# Patient Record
Sex: MALE | Race: WHITE | NOT HISPANIC OR LATINO
[De-identification: names, ages, dates, MRNs, and addresses within clinical notes are randomized per-mention and may not be internally consistent; named-entity substitution may affect disease eponyms.]

---

## 2017-04-11 ENCOUNTER — FORM ENCOUNTER (OUTPATIENT)
Age: 72
End: 2017-04-11

## 2017-05-31 ENCOUNTER — OUTPATIENT (OUTPATIENT)
Dept: OUTPATIENT SERVICES | Facility: HOSPITAL | Age: 72
LOS: 1 days | End: 2017-05-31
Payer: MEDICARE

## 2017-05-31 VITALS
TEMPERATURE: 98 F | SYSTOLIC BLOOD PRESSURE: 110 MMHG | RESPIRATION RATE: 16 BRPM | HEIGHT: 72.5 IN | HEART RATE: 60 BPM | DIASTOLIC BLOOD PRESSURE: 68 MMHG | WEIGHT: 268.08 LBS

## 2017-05-31 DIAGNOSIS — Z98.890 OTHER SPECIFIED POSTPROCEDURAL STATES: Chronic | ICD-10-CM

## 2017-05-31 DIAGNOSIS — K40.90 UNILATERAL INGUINAL HERNIA, WITHOUT OBSTRUCTION OR GANGRENE, NOT SPECIFIED AS RECURRENT: ICD-10-CM

## 2017-05-31 DIAGNOSIS — Z12.11 ENCOUNTER FOR SCREENING FOR MALIGNANT NEOPLASM OF COLON: Chronic | ICD-10-CM

## 2017-05-31 DIAGNOSIS — E11.9 TYPE 2 DIABETES MELLITUS WITHOUT COMPLICATIONS: ICD-10-CM

## 2017-05-31 DIAGNOSIS — G47.33 OBSTRUCTIVE SLEEP APNEA (ADULT) (PEDIATRIC): ICD-10-CM

## 2017-05-31 LAB
BUN SERPL-MCNC: 15 MG/DL — SIGNIFICANT CHANGE UP (ref 7–23)
CALCIUM SERPL-MCNC: 9.5 MG/DL — SIGNIFICANT CHANGE UP (ref 8.4–10.5)
CHLORIDE SERPL-SCNC: 103 MMOL/L — SIGNIFICANT CHANGE UP (ref 98–107)
CO2 SERPL-SCNC: 28 MMOL/L — SIGNIFICANT CHANGE UP (ref 22–31)
CREAT SERPL-MCNC: 1.17 MG/DL — SIGNIFICANT CHANGE UP (ref 0.5–1.3)
GLUCOSE SERPL-MCNC: 95 MG/DL — SIGNIFICANT CHANGE UP (ref 70–99)
HBA1C BLD-MCNC: 6.1 % — HIGH (ref 4–5.6)
HCT VFR BLD CALC: 42.9 % — SIGNIFICANT CHANGE UP (ref 39–50)
HGB BLD-MCNC: 14.2 G/DL — SIGNIFICANT CHANGE UP (ref 13–17)
MCHC RBC-ENTMCNC: 30.2 PG — SIGNIFICANT CHANGE UP (ref 27–34)
MCHC RBC-ENTMCNC: 33.1 % — SIGNIFICANT CHANGE UP (ref 32–36)
MCV RBC AUTO: 91.3 FL — SIGNIFICANT CHANGE UP (ref 80–100)
PLATELET # BLD AUTO: 283 K/UL — SIGNIFICANT CHANGE UP (ref 150–400)
PMV BLD: 10.1 FL — SIGNIFICANT CHANGE UP (ref 7–13)
POTASSIUM SERPL-MCNC: 4.8 MMOL/L — SIGNIFICANT CHANGE UP (ref 3.5–5.3)
POTASSIUM SERPL-SCNC: 4.8 MMOL/L — SIGNIFICANT CHANGE UP (ref 3.5–5.3)
RBC # BLD: 4.7 M/UL — SIGNIFICANT CHANGE UP (ref 4.2–5.8)
RBC # FLD: 13.7 % — SIGNIFICANT CHANGE UP (ref 10.3–14.5)
SODIUM SERPL-SCNC: 143 MMOL/L — SIGNIFICANT CHANGE UP (ref 135–145)
WBC # BLD: 7.14 K/UL — SIGNIFICANT CHANGE UP (ref 3.8–10.5)
WBC # FLD AUTO: 7.14 K/UL — SIGNIFICANT CHANGE UP (ref 3.8–10.5)

## 2017-05-31 PROCEDURE — 93010 ELECTROCARDIOGRAM REPORT: CPT

## 2017-05-31 RX ORDER — SODIUM CHLORIDE 9 MG/ML
1000 INJECTION, SOLUTION INTRAVENOUS
Qty: 0 | Refills: 0 | Status: DISCONTINUED | OUTPATIENT
Start: 2017-06-09 | End: 2017-06-24

## 2017-05-31 NOTE — H&P PST ADULT - GASTROINTESTINAL DETAILS
bowel sounds normal/soft/no masses palpable/nontender/no distention bowel sounds normal/nontender/no distention/soft/no masses palpable/obese

## 2017-05-31 NOTE — H&P PST ADULT - PROBLEM SELECTOR PLAN 1
Pt scheduled for repair of left inguinal hernia with mesh for 6/9/17. Preop instructions, Pepcid, surgical scrub provided. Pt stated understanding. Comparison EKG on chart from 8/5/15. Pending medical clearance.

## 2017-05-31 NOTE — H&P PST ADULT - NEGATIVE MUSCULOSKELETAL SYMPTOMS
no back pain/no stiffness/no neck pain/no joint swelling/no arthralgia/no arthritis/no muscle cramps/no myalgia

## 2017-05-31 NOTE — H&P PST ADULT - PSH
Encounter for screening colonoscopy    H/O eye surgery  removal of right eye pigmentation >35 years ago  H/O right inguinal hernia repair  8/2015

## 2017-05-31 NOTE — H&P PST ADULT - NEGATIVE CARDIOVASCULAR SYMPTOMS
no paroxysmal nocturnal dyspnea/no claudication/no palpitations/no chest pain/no orthopnea/no dyspnea on exertion/no peripheral edema

## 2017-05-31 NOTE — H&P PST ADULT - GENERAL GENITOURINARY SYMPTOMS
nocturia/increased urinary frequency nocturia/due to BPH, follows up with urology/increased urinary frequency

## 2017-05-31 NOTE — H&P PST ADULT - NEUROLOGICAL DETAILS
normal strength/alert and oriented x 3 cranial nerves intact/sensation intact/alert and oriented x 3

## 2017-05-31 NOTE — H&P PST ADULT - NEGATIVE NEUROLOGICAL SYMPTOMS
no focal seizures/no facial palsy/no paresthesias/no loss of sensation/no confusion/no difficulty walking/no syncope/no tremors/no generalized seizures/no vertigo/no transient paralysis/no weakness

## 2017-05-31 NOTE — H&P PST ADULT - PMH
Asthma  h/o childhood  BPH (benign prostatic hypertrophy)    Diabetes mellitus  type II  Dyslipidemia    Inguinal hernia  Left  Intracranial hemorrhage  h/o 20 yrs ago  Obesity    Obstructive sleep apnea Asthma  h/o childhood  BPH (benign prostatic hypertrophy)    Diabetes mellitus  type II  Dyslipidemia    Inguinal hernia  Left  Intracranial hemorrhage  h/o 20 yrs ago  Obesity    Obstructive sleep apnea    Umbilical hernia

## 2017-05-31 NOTE — H&P PST ADULT - NEGATIVE OPHTHALMOLOGIC SYMPTOMS
no discharge L/no pain L/no loss of vision R/no diplopia/no blurred vision R/no discharge R/no pain R/no blurred vision L/no photophobia/no loss of vision L

## 2017-05-31 NOTE — H&P PST ADULT - HISTORY OF PRESENT ILLNESS
73 years     Pt is scheduled for repair of left inguinal hernia with mesh for 6/9/17 73 years old male with Hx of left inguinal hernia, diagnosed on a CT scan, becoming bothersome, in the past year, associated with mild discomfort. Pt is s/p right inguinal hernia repair in 8/2015. Pt scheduled for repair of left inguinal hernia with mesh for 6/9/17

## 2017-05-31 NOTE — H&P PST ADULT - RS GEN PE MLT RESP DETAILS PC
clear to auscultation bilaterally/respirations non-labored/breath sounds equal airway patent/clear to auscultation bilaterally/no rhonchi/respirations non-labored/no intercostal retractions/no chest wall tenderness/no rales/no subcutaneous emphysema/good air movement/breath sounds equal/no wheezes

## 2017-05-31 NOTE — H&P PST ADULT - NEGATIVE GENERAL GENITOURINARY SYMPTOMS
no bladder infections/no dysuria/no flank pain L/no incontinence/no urinary hesitancy/no renal colic no renal colic/no flank pain L/no flank pain R/no incontinence/no dysuria/no urinary hesitancy/no bladder infections

## 2017-05-31 NOTE — H&P PST ADULT - VISION (WITH CORRECTIVE LENSES IF THE PATIENT USUALLY WEARS THEM):
Pt uses glasses/Partially impaired: cannot see medication labels or newsprint, but can see obstacles in path, and the surrounding layout; can count fingers at arm's length

## 2017-05-31 NOTE — H&P PST ADULT - GASTROINTESTINAL COMMENTS
umbilical hernia, and left inguinal hernia umbilical hernia umbilical hernia, left inguinal hernia, very small, palpable upon coughing, non tender

## 2017-05-31 NOTE — H&P PST ADULT - FAMILY HISTORY
Mother  Still living? No  Family history of lung cancer, Age at diagnosis: 71-80     Father  Still living? No  Family history of diabetes mellitus, Age at diagnosis: Age Unknown  Family history of coronary artery disease, Age at diagnosis: Age Unknown

## 2017-05-31 NOTE — H&P PST ADULT - CONSTITUTIONAL DETAILS
no distress/well-nourished/well-groomed no distress/well-nourished/well-groomed/well-developed/obese

## 2017-05-31 NOTE — H&P PST ADULT - NEGATIVE GENERAL SYMPTOMS
no fever/no malaise/no weight loss/no polyphagia/no polyuria/no polydipsia/no fatigue/no anorexia/no weight gain/no chills/no sweating

## 2017-05-31 NOTE — H&P PST ADULT - LYMPHATIC
anterior cervical L/supraclavicular R/supraclavicular L/posterior cervical L/posterior cervical R/anterior cervical R

## 2017-05-31 NOTE — H&P PST ADULT - MUSCULOSKELETAL
details… detailed exam no joint warmth/no joint swelling/no joint erythema no joint warmth/ROM intact/no calf tenderness/no joint erythema/normal strength/no joint swelling

## 2017-06-08 NOTE — ASU PATIENT PROFILE, ADULT - PMH
Asthma  h/o childhood  BPH (benign prostatic hypertrophy)    Diabetes mellitus  type II  Dyslipidemia    Inguinal hernia  Left  Intracranial hemorrhage  h/o 20 yrs ago  Obesity    Obstructive sleep apnea    Umbilical hernia

## 2017-06-09 ENCOUNTER — RESULT REVIEW (OUTPATIENT)
Age: 72
End: 2017-06-09

## 2017-06-09 ENCOUNTER — TRANSCRIPTION ENCOUNTER (OUTPATIENT)
Age: 72
End: 2017-06-09

## 2017-06-09 ENCOUNTER — OUTPATIENT (OUTPATIENT)
Dept: OUTPATIENT SERVICES | Facility: HOSPITAL | Age: 72
LOS: 1 days | Discharge: HOME | End: 2017-06-09
Payer: MEDICARE

## 2017-06-09 VITALS
OXYGEN SATURATION: 96 % | RESPIRATION RATE: 16 BRPM | WEIGHT: 268.08 LBS | TEMPERATURE: 98 F | DIASTOLIC BLOOD PRESSURE: 74 MMHG | HEIGHT: 72.5 IN | SYSTOLIC BLOOD PRESSURE: 130 MMHG | HEART RATE: 67 BPM

## 2017-06-09 VITALS
RESPIRATION RATE: 18 BRPM | OXYGEN SATURATION: 97 % | DIASTOLIC BLOOD PRESSURE: 75 MMHG | HEART RATE: 74 BPM | SYSTOLIC BLOOD PRESSURE: 120 MMHG

## 2017-06-09 DIAGNOSIS — K40.90 UNILATERAL INGUINAL HERNIA, WITHOUT OBSTRUCTION OR GANGRENE, NOT SPECIFIED AS RECURRENT: ICD-10-CM

## 2017-06-09 DIAGNOSIS — Z98.890 OTHER SPECIFIED POSTPROCEDURAL STATES: Chronic | ICD-10-CM

## 2017-06-09 DIAGNOSIS — Z12.11 ENCOUNTER FOR SCREENING FOR MALIGNANT NEOPLASM OF COLON: Chronic | ICD-10-CM

## 2017-06-09 PROCEDURE — 88304 TISSUE EXAM BY PATHOLOGIST: CPT | Mod: 26

## 2017-06-09 PROCEDURE — 88305 TISSUE EXAM BY PATHOLOGIST: CPT | Mod: 26

## 2017-06-09 RX ORDER — TAMSULOSIN HYDROCHLORIDE 0.4 MG/1
0.4 CAPSULE ORAL ONCE
Qty: 0 | Refills: 0 | Status: COMPLETED | OUTPATIENT
Start: 2017-06-09 | End: 2017-06-09

## 2017-06-09 RX ORDER — HYDROMORPHONE HYDROCHLORIDE 2 MG/ML
1 INJECTION INTRAMUSCULAR; INTRAVENOUS; SUBCUTANEOUS
Qty: 0 | Refills: 0 | Status: DISCONTINUED | OUTPATIENT
Start: 2017-06-09 | End: 2017-06-09

## 2017-06-09 RX ORDER — ONDANSETRON 8 MG/1
4 TABLET, FILM COATED ORAL ONCE
Qty: 0 | Refills: 0 | Status: DISCONTINUED | OUTPATIENT
Start: 2017-06-09 | End: 2017-06-09

## 2017-06-09 RX ORDER — LIDOCAINE HCL 20 MG/ML
20 VIAL (ML) INJECTION ONCE
Qty: 0 | Refills: 0 | Status: DISCONTINUED | OUTPATIENT
Start: 2017-06-09 | End: 2017-06-24

## 2017-06-09 RX ORDER — LIDOCAINE HCL 20 MG/ML
VIAL (ML) INJECTION
Qty: 0 | Refills: 0 | Status: DISCONTINUED | OUTPATIENT
Start: 2017-06-09 | End: 2017-06-24

## 2017-06-09 RX ORDER — LIDOCAINE 4 G/100G
1 CREAM TOPICAL ONCE
Qty: 0 | Refills: 0 | Status: DISCONTINUED | OUTPATIENT
Start: 2017-06-09 | End: 2017-06-24

## 2017-06-09 RX ORDER — OXYCODONE HYDROCHLORIDE 5 MG/1
1 TABLET ORAL
Qty: 25 | Refills: 0 | OUTPATIENT
Start: 2017-06-09

## 2017-06-09 RX ORDER — SODIUM CHLORIDE 9 MG/ML
1000 INJECTION, SOLUTION INTRAVENOUS
Qty: 0 | Refills: 0 | Status: DISCONTINUED | OUTPATIENT
Start: 2017-06-09 | End: 2017-06-24

## 2017-06-09 RX ORDER — HYDROMORPHONE HYDROCHLORIDE 2 MG/ML
0.5 INJECTION INTRAMUSCULAR; INTRAVENOUS; SUBCUTANEOUS
Qty: 0 | Refills: 0 | Status: DISCONTINUED | OUTPATIENT
Start: 2017-06-09 | End: 2017-06-09

## 2017-06-09 RX ADMIN — SODIUM CHLORIDE 50 MILLILITER(S): 9 INJECTION, SOLUTION INTRAVENOUS at 11:13

## 2017-06-09 RX ADMIN — TAMSULOSIN HYDROCHLORIDE 0.4 MILLIGRAM(S): 0.4 CAPSULE ORAL at 14:56

## 2017-06-09 RX ADMIN — HYDROMORPHONE HYDROCHLORIDE 0.5 MILLIGRAM(S): 2 INJECTION INTRAMUSCULAR; INTRAVENOUS; SUBCUTANEOUS at 11:55

## 2017-06-09 RX ADMIN — HYDROMORPHONE HYDROCHLORIDE 0.5 MILLIGRAM(S): 2 INJECTION INTRAMUSCULAR; INTRAVENOUS; SUBCUTANEOUS at 12:05

## 2017-06-09 NOTE — PROCEDURE NOTE - NSURITECHNIQUE_GU_A_CORE
The catheter was appropriately lubricated/The site was cleaned with soap/water and sterile solution (betadine)/The catheter was secured with a securement device (e.g. StatLock)/The urinary drainage system is closed at the end of the procedure/All applicable medical record documentation is completed/Proper hand hygiene was performed/A sterile drape was used to cover all adjacent areas/Sterile gloves were worn for the duration of the procedure

## 2017-06-09 NOTE — PROCEDURE NOTE - NSPOSTCAREGUIDE_GEN_A_CORE
Instructed patient/caregiver regarding signs and symptoms of infection/Instructed patient/caregiver to follow-up with primary care physician/Verbal/written post procedure instructions were given to patient/caregiver

## 2017-06-09 NOTE — ASU DISCHARGE PLAN (ADULT/PEDIATRIC). - MEDICATION SUMMARY - MEDICATIONS TO TAKE
I will START or STAY ON the medications listed below when I get home from the hospital:    bupropion  mg 2 tabs orally daily  last dose 5/31  --     -- Indication: For Anxiety/Depression    acetaminophen-oxycodone 325 mg-5 mg oral tablet  -- 1 to 2 tab(s) by mouth every 4 to 6 hours, As Needed for Moderate to Severe Pain MDD:8 tablets  -- Caution federal law prohibits the transfer of this drug to any person other  than the person for whom it was prescribed.  May cause drowsiness.  Alcohol may intensify this effect.  Use care when operating dangerous machinery.  This prescription cannot be refilled.  This product contains acetaminophen.  Do not use  with any other product containing acetaminophen to prevent possible liver damage.  Using more of this medication than prescribed may cause serious breathing problems.    -- Indication: For Pain control    tamsulosin 0.4 mg oral capsule  -- 1 cap(s) by mouth once a day in pm  -- Indication: For Enlarged prostate    zonisamide 100 mg oral capsule  -- 1 cap(s) by mouth once a day  last dsoe 5/31  -- Indication: For Home medication    metFORMIN 500 mg oral tablet  -- 2 tab(s) by mouth once a day (at bedtime)  -- Indication: For Diabetes    simvastatin 40 mg oral tablet  -- 1 tab(s) by mouth once a day (at bedtime)  -- Indication: For Hyperlipidemia    naltrexone 50 mg oral tablet  -- 1 tab(s) by mouth once a day  last dose 5/31  -- Do not take this medication if you require narcotics for pain control  -- Indication: For Opioid dependence    azelastine 137 mcg/inh (0.1%) nasal spray  -- 2 spray(s) into nose 2 times a day, As Needed  -- Indication: For Nasal congestion    fluticasone 50 mcg/inh nasal spray  -- 1 spray(s) into nose 2 times a day  -- Indication: For Allergies

## 2017-06-09 NOTE — ASU DISCHARGE PLAN (ADULT/PEDIATRIC). - NURSING INSTRUCTIONS
Do not take pain medication on an empty stomach.  Increase fluids and fiber in diet to prevent constipation. No heavy lifting or straining.  Apply ice 20 minutes on 20 minutes off for pain management.  Leave steri strips intact.  Take pain medicine with food. To prevent constipation increase fluids and fiber in diet. Do not take pain medication on an empty stomach.  Increase fluids and fiber in diet to prevent constipation. No heavy lifting or straining.  Apply ice 20 minutes on 20 minutes off for pain management.  Leave steri strips intact.  Take pain medicine with food. To prevent constipation increase fluids and fiber in diet.  If at any time you notice that no urine has drained for more than one hour, make sure that you are drinking adequate liquids. If this persists despite increases in your liquid intake, call your urologist. Do not allow the catheter to restrict your activity. Moving about and walking are important.  South care notes/ leg bag care notes provided

## 2017-06-09 NOTE — PROCEDURE NOTE - NSPROCDETAILS_GEN_ALL_CORE
kit not available for this size catheter/prior to placement, an active physician order for the placement of a urinary catheter was verified/sterile technique, indwelling urinary device inserted

## 2017-06-09 NOTE — ASU DISCHARGE PLAN (ADULT/PEDIATRIC). - NOTIFY
Unable to Urinate/Persistent Nausea and Vomiting/Bleeding that does not stop/Fever greater than 101/Inability to Tolerate Liquids or Foods/Pain not relieved by Medications Persistent Nausea and Vomiting/Inability to Tolerate Liquids or Foods/Unable to Urinate/Bleeding that does not stop/Pain not relieved by Medications/Numbness, color, or temperature change to extremity/Fever greater than 101/Numbness, tingling

## 2017-06-09 NOTE — ASU DISCHARGE PLAN (ADULT/PEDIATRIC). - FOLLOWUP APPOINTMENT CLINIC/PHYSICIAN
follow upw ith Dr Carpenter within 1-2 weeks  Must follow up with Dr Espinoza in 1 week for ba removal

## 2017-06-09 NOTE — ASU DISCHARGE PLAN (ADULT/PEDIATRIC). - SPECIAL INSTRUCTIONS
Your left groin hernia was repaired. It was also felt that it was necessary to remove the skin lesion next to the hernia incision. Take pain medication as needed. A prescription has already been sent to your pharmacy. Don't drive if taking narcotics. No heavy lifting for 6-8 weeks. You may shower after 24 hours. On your wound are white strips called steri strips. These will fall off on their own or will be removed in office. Follow up with Dr. Carpenter in 1-2 weeks to check the wounds and for pathology results. Call to schedule an appointment. Your left groin hernia was repaired. It was also felt that it was necessary to remove the skin lesion next to the hernia incision. Take pain medication as needed. A prescription has already been sent to your pharmacy. Don't drive if taking narcotics. No heavy lifting for 6-8 weeks. You may shower after 24 hours. On your wound are white strips called steri strips. These will fall off on their own or will be removed in office. Follow up with Dr. Carpenter in 1-2 weeks to check the wounds and for pathology results. Call to schedule an appointment.    You were unable to void after your surgery due to your BPH. You will go home with a ba catheter and keep this in place until your follow up with your urologist. Your left groin hernia was repaired. It was also felt that it was necessary to remove the skin lesion next to the hernia incision. Take pain medication as needed. A prescription has already been sent to your pharmacy. Don't drive if taking narcotics. No heavy lifting for 6-8 weeks. You may shower after 24 hours. On your wound are white strips called steri strips. These will fall off on their own or will be removed in office. Follow up with Dr. Carpenter in 1-2 weeks to check the wounds and for pathology results. Call to schedule an appointment.    You were unable to void after your surgery due to your BPH. A Coude catheter was inserted. You will go home with this ba catheter and keep it in place until your follow up with your urologist in 4-7 days. Your left groin hernia was repaired. It was also felt that it was necessary to remove the skin lesion next to the hernia incision. Take pain medication as needed. A prescription has already been sent to your pharmacy. Don't drive if taking narcotics. No heavy lifting for 6-8 weeks. You may shower after 24 hours. On your wound are white strips called steri strips. These will fall off on their own or will be removed in office. Follow up with Dr. Carpenter in 1-2 weeks to check the wounds and for pathology results. Call to schedule an appointment.    You were unable to void after your surgery due to your BPH. A Coude catheter was inserted via a cystogram resulting in the removal of 2 liters of urine. You will go home with this ba catheter and keep it in place until your follow up with your urologist Dr. Espinoza in 7 days. Please call [phone] to schedule a appointment in 1 week. Your left groin hernia was repaired. It was also felt that it was necessary to remove the skin lesion next to the hernia incision. Take pain medication as needed. A prescription has already been sent to your pharmacy. Don't drive if taking narcotics. No heavy lifting for 6-8 weeks. You may shower after 24 hours. On your wound are white strips called steri strips. These will fall off on their own or will be removed in office. Follow up with Dr. Carpenter in 1-2 weeks to check the wounds and for pathology results. Call to schedule an appointment.    Follow up with Dr Espinoza within 1 week for ba catheter removal     You were unable to void after your surgery due to your BPH. A Coude catheter was inserted via a cystogram resulting in the removal of 2 liters of urine. You will go home with this ba catheter and keep it in place until your follow up with your urologist Dr. Espinoza in 7 days. Please call [phone] to schedule a appointment in 1 week.

## 2017-06-09 NOTE — BRIEF OPERATIVE NOTE - OPERATION/FINDINGS
Left indirect inginal hernia. Repair with mesh. Large cord lipoma excised. Transversalis fascia opened with mesh placed in pre-peritoneal space, floor reapproximated over mesh.

## 2017-07-13 ENCOUNTER — APPOINTMENT (OUTPATIENT)
Dept: UROLOGY | Facility: CLINIC | Age: 72
End: 2017-07-13

## 2017-07-28 ENCOUNTER — OUTPATIENT (OUTPATIENT)
Dept: OUTPATIENT SERVICES | Facility: HOSPITAL | Age: 72
LOS: 1 days | End: 2017-07-28
Payer: MEDICARE

## 2017-07-28 ENCOUNTER — APPOINTMENT (OUTPATIENT)
Dept: UROLOGY | Facility: CLINIC | Age: 72
End: 2017-07-28
Payer: MEDICARE

## 2017-07-28 VITALS
HEIGHT: 73 IN | RESPIRATION RATE: 17 BRPM | HEART RATE: 72 BPM | DIASTOLIC BLOOD PRESSURE: 75 MMHG | SYSTOLIC BLOOD PRESSURE: 127 MMHG

## 2017-07-28 DIAGNOSIS — Z98.890 OTHER SPECIFIED POSTPROCEDURAL STATES: Chronic | ICD-10-CM

## 2017-07-28 DIAGNOSIS — Z12.11 ENCOUNTER FOR SCREENING FOR MALIGNANT NEOPLASM OF COLON: Chronic | ICD-10-CM

## 2017-07-28 PROCEDURE — 51702 INSERT TEMP BLADDER CATH: CPT

## 2017-08-03 DIAGNOSIS — R33.9 RETENTION OF URINE, UNSPECIFIED: ICD-10-CM

## 2017-08-07 ENCOUNTER — APPOINTMENT (OUTPATIENT)
Dept: SURGERY | Facility: CLINIC | Age: 72
End: 2017-08-07
Payer: MEDICARE

## 2017-08-07 VITALS
SYSTOLIC BLOOD PRESSURE: 131 MMHG | TEMPERATURE: 98 F | DIASTOLIC BLOOD PRESSURE: 81 MMHG | HEART RATE: 90 BPM | HEIGHT: 73 IN | WEIGHT: 280 LBS | BODY MASS INDEX: 37.11 KG/M2

## 2017-08-07 PROCEDURE — 99203 OFFICE O/P NEW LOW 30 MIN: CPT

## 2017-08-08 ENCOUNTER — FORM ENCOUNTER (OUTPATIENT)
Age: 72
End: 2017-08-08

## 2017-08-14 ENCOUNTER — OUTPATIENT (OUTPATIENT)
Dept: OUTPATIENT SERVICES | Facility: HOSPITAL | Age: 72
LOS: 1 days | End: 2017-08-14
Payer: MEDICARE

## 2017-08-14 VITALS
TEMPERATURE: 98 F | HEART RATE: 67 BPM | WEIGHT: 287.92 LBS | OXYGEN SATURATION: 98 % | HEIGHT: 72 IN | RESPIRATION RATE: 16 BRPM | SYSTOLIC BLOOD PRESSURE: 110 MMHG | DIASTOLIC BLOOD PRESSURE: 70 MMHG

## 2017-08-14 DIAGNOSIS — N40.0 BENIGN PROSTATIC HYPERPLASIA WITHOUT LOWER URINARY TRACT SYMPTOMS: ICD-10-CM

## 2017-08-14 DIAGNOSIS — Z98.890 OTHER SPECIFIED POSTPROCEDURAL STATES: Chronic | ICD-10-CM

## 2017-08-14 DIAGNOSIS — Z12.11 ENCOUNTER FOR SCREENING FOR MALIGNANT NEOPLASM OF COLON: Chronic | ICD-10-CM

## 2017-08-14 DIAGNOSIS — E11.9 TYPE 2 DIABETES MELLITUS WITHOUT COMPLICATIONS: ICD-10-CM

## 2017-08-14 LAB
APPEARANCE UR: CLEAR — SIGNIFICANT CHANGE UP
BACTERIA # UR AUTO: SIGNIFICANT CHANGE UP
BILIRUB UR-MCNC: NEGATIVE — SIGNIFICANT CHANGE UP
BLD GP AB SCN SERPL QL: NEGATIVE — SIGNIFICANT CHANGE UP
BLOOD UR QL VISUAL: NEGATIVE — SIGNIFICANT CHANGE UP
BUN SERPL-MCNC: 16 MG/DL — SIGNIFICANT CHANGE UP (ref 7–23)
CALCIUM SERPL-MCNC: 9.5 MG/DL — SIGNIFICANT CHANGE UP (ref 8.4–10.5)
CHLORIDE SERPL-SCNC: 102 MMOL/L — SIGNIFICANT CHANGE UP (ref 98–107)
CO2 SERPL-SCNC: 28 MMOL/L — SIGNIFICANT CHANGE UP (ref 22–31)
COLOR SPEC: SIGNIFICANT CHANGE UP
CREAT SERPL-MCNC: 1.09 MG/DL — SIGNIFICANT CHANGE UP (ref 0.5–1.3)
GLUCOSE SERPL-MCNC: 88 MG/DL — SIGNIFICANT CHANGE UP (ref 70–99)
GLUCOSE UR-MCNC: NEGATIVE — SIGNIFICANT CHANGE UP
HBA1C BLD-MCNC: 6.1 % — HIGH (ref 4–5.6)
HCT VFR BLD CALC: 45.9 % — SIGNIFICANT CHANGE UP (ref 39–50)
HGB BLD-MCNC: 14.7 G/DL — SIGNIFICANT CHANGE UP (ref 13–17)
KETONES UR-MCNC: NEGATIVE — SIGNIFICANT CHANGE UP
LEUKOCYTE ESTERASE UR-ACNC: HIGH
MCHC RBC-ENTMCNC: 29.3 PG — SIGNIFICANT CHANGE UP (ref 27–34)
MCHC RBC-ENTMCNC: 32 % — SIGNIFICANT CHANGE UP (ref 32–36)
MCV RBC AUTO: 91.6 FL — SIGNIFICANT CHANGE UP (ref 80–100)
NITRITE UR-MCNC: POSITIVE — HIGH
NRBC # FLD: 0 — SIGNIFICANT CHANGE UP
PH UR: 7 — SIGNIFICANT CHANGE UP (ref 4.6–8)
PLATELET # BLD AUTO: 244 K/UL — SIGNIFICANT CHANGE UP (ref 150–400)
PMV BLD: 10 FL — SIGNIFICANT CHANGE UP (ref 7–13)
POTASSIUM SERPL-MCNC: 5.2 MMOL/L — SIGNIFICANT CHANGE UP (ref 3.5–5.3)
POTASSIUM SERPL-SCNC: 5.2 MMOL/L — SIGNIFICANT CHANGE UP (ref 3.5–5.3)
PROT UR-MCNC: NEGATIVE — SIGNIFICANT CHANGE UP
RBC # BLD: 5.01 M/UL — SIGNIFICANT CHANGE UP (ref 4.2–5.8)
RBC # FLD: 13.2 % — SIGNIFICANT CHANGE UP (ref 10.3–14.5)
RH IG SCN BLD-IMP: POSITIVE — SIGNIFICANT CHANGE UP
SODIUM SERPL-SCNC: 141 MMOL/L — SIGNIFICANT CHANGE UP (ref 135–145)
SP GR SPEC: 1 — LOW (ref 1–1.03)
UROBILINOGEN FLD QL: NORMAL E.U. — SIGNIFICANT CHANGE UP (ref 0.1–0.2)
WBC # BLD: 9.15 K/UL — SIGNIFICANT CHANGE UP (ref 3.8–10.5)
WBC # FLD AUTO: 9.15 K/UL — SIGNIFICANT CHANGE UP (ref 3.8–10.5)
WBC UR QL: SIGNIFICANT CHANGE UP (ref 0–?)

## 2017-08-14 PROCEDURE — 93010 ELECTROCARDIOGRAM REPORT: CPT

## 2017-08-14 NOTE — H&P PST ADULT - ANESTHESIA, PREVIOUS REACTION, PROFILE
denies family hx of malignant hyperthermia/none none/denies family hx of malignant hyperthermia/delayed awakening

## 2017-08-14 NOTE — H&P PST ADULT - MALLAMPATI CLASS
Class II - visualization of the soft palate, fauces, and uvula Class II - visualization of the soft palate, fauces, and uvula/with phonation

## 2017-08-14 NOTE — H&P PST ADULT - NEUROLOGICAL COMMENTS
hx of CVA hemorrhage 20 yrs ago - hx of CVA hemorrhage 20 yrs ago - mild residual deficits of cognition and memory

## 2017-08-14 NOTE — H&P PST ADULT - NEUROLOGICAL DETAILS
alert and oriented x 3/responds to verbal commands/normal strength/sensation intact/responds to pain

## 2017-08-14 NOTE — H&P PST ADULT - NEGATIVE GENERAL GENITOURINARY SYMPTOMS
no flank pain R/no flank pain L/no hematuria no hematuria/no flank pain L/no flank pain R/no dysuria

## 2017-08-14 NOTE — H&P PST ADULT - NEGATIVE ENMT SYMPTOMS
no throat pain/no dysphagia/no nasal congestion/no hearing difficulty/no ear pain/no tinnitus/no vertigo/no sinus symptoms/no nasal discharge

## 2017-08-14 NOTE — H&P PST ADULT - MENTAL STATUS
pt states he has mild difficulty with math problems and some slight cognition deficits from CVA 20 yrs ago

## 2017-08-14 NOTE — H&P PST ADULT - NEGATIVE NEUROLOGICAL SYMPTOMS
no weakness/no transient paralysis/no paresthesias/no generalized seizures/no difficulty walking/no tremors/no focal seizures/no syncope/no confusion

## 2017-08-14 NOTE — H&P PST ADULT - PROBLEM SELECTOR PLAN 1
Pt given pre-op instruction and Famotidine and chlorhexidine and verbalized understanding.  OR Booking notified of Sleep apnea and PCN allergy and ba.   Pt to call Dr Temple office and speak with  to confirm hernia surgery.   MC requested due to recent surgery and form given.

## 2017-08-14 NOTE — H&P PST ADULT - HISTORY OF PRESENT ILLNESS
73 years old male with Hx of left inguinal hernia, diagnosed on a CT scan, becoming bothersome, in the past year, associated with mild discomfort. Pt is s/p right inguinal hernia repair in 8/2015. Pt scheduled for repair of left inguinal hernia with mesh for 6/9/17 Pt is a 72 yr old male scheduled for Robotic suprapubic Prostatectomy with Dr Castillo 8/28/17. Pt also states he is to have Umbilical Hernia done by Dr Temple but codes have not been entered as of today. Pt has ba in place  - S/P hernia surgery 6/9/17 and unable to void for ba placed and has been changed 3x since. Most recent 2 weeks ago - pt c/o of severely enlarged prostate and had difficulty voiding.

## 2017-08-15 LAB — SPECIMEN SOURCE: SIGNIFICANT CHANGE UP

## 2017-08-17 LAB
-  AMIKACIN: SIGNIFICANT CHANGE UP
-  AZTREONAM: SIGNIFICANT CHANGE UP
-  CEFEPIME: SIGNIFICANT CHANGE UP
-  CEFTAZIDIME: SIGNIFICANT CHANGE UP
-  CIPROFLOXACIN: SIGNIFICANT CHANGE UP
-  GENTAMICIN: SIGNIFICANT CHANGE UP
-  IMIPENEM: SIGNIFICANT CHANGE UP
-  LEVOFLOXACIN: SIGNIFICANT CHANGE UP
-  MEROPENEM: SIGNIFICANT CHANGE UP
-  PIPERACILLIN/TAZOBACTAM: SIGNIFICANT CHANGE UP
-  TOBRAMYCIN: SIGNIFICANT CHANGE UP
BACTERIA UR CULT: SIGNIFICANT CHANGE UP
METHOD TYPE: SIGNIFICANT CHANGE UP
ORGANISM # SPEC MICROSCOPIC CNT: SIGNIFICANT CHANGE UP
ORGANISM # SPEC MICROSCOPIC CNT: SIGNIFICANT CHANGE UP

## 2017-08-28 ENCOUNTER — APPOINTMENT (OUTPATIENT)
Dept: SURGERY | Facility: HOSPITAL | Age: 72
End: 2017-08-28

## 2017-08-28 ENCOUNTER — APPOINTMENT (OUTPATIENT)
Dept: UROLOGY | Facility: HOSPITAL | Age: 72
End: 2017-08-28

## 2017-08-28 ENCOUNTER — MESSAGE (OUTPATIENT)
Age: 72
End: 2017-08-28

## 2017-08-30 ENCOUNTER — RECORD ABSTRACTING (OUTPATIENT)
Age: 72
End: 2017-08-30

## 2017-09-05 ENCOUNTER — APPOINTMENT (OUTPATIENT)
Dept: UROLOGY | Facility: CLINIC | Age: 72
End: 2017-09-05
Payer: MEDICARE

## 2017-09-05 ENCOUNTER — OUTPATIENT (OUTPATIENT)
Dept: OUTPATIENT SERVICES | Facility: HOSPITAL | Age: 72
LOS: 1 days | End: 2017-09-05
Payer: MEDICARE

## 2017-09-05 VITALS
SYSTOLIC BLOOD PRESSURE: 136 MMHG | DIASTOLIC BLOOD PRESSURE: 79 MMHG | RESPIRATION RATE: 16 BRPM | HEART RATE: 102 BPM | TEMPERATURE: 99.3 F

## 2017-09-05 DIAGNOSIS — Z98.890 OTHER SPECIFIED POSTPROCEDURAL STATES: Chronic | ICD-10-CM

## 2017-09-05 DIAGNOSIS — Z12.11 ENCOUNTER FOR SCREENING FOR MALIGNANT NEOPLASM OF COLON: Chronic | ICD-10-CM

## 2017-09-05 DIAGNOSIS — R35.0 FREQUENCY OF MICTURITION: ICD-10-CM

## 2017-09-05 PROCEDURE — 99213 OFFICE O/P EST LOW 20 MIN: CPT | Mod: 25

## 2017-09-05 PROCEDURE — 51702 INSERT TEMP BLADDER CATH: CPT

## 2017-09-06 ENCOUNTER — EMERGENCY (EMERGENCY)
Facility: HOSPITAL | Age: 72
LOS: 1 days | Discharge: HOME | End: 2017-09-06
Attending: EMERGENCY MEDICINE | Admitting: EMERGENCY MEDICINE
Payer: MEDICARE

## 2017-09-06 ENCOUNTER — MESSAGE (OUTPATIENT)
Age: 72
End: 2017-09-06

## 2017-09-06 VITALS
OXYGEN SATURATION: 97 % | DIASTOLIC BLOOD PRESSURE: 89 MMHG | RESPIRATION RATE: 20 BRPM | SYSTOLIC BLOOD PRESSURE: 160 MMHG | HEART RATE: 94 BPM

## 2017-09-06 VITALS
RESPIRATION RATE: 18 BRPM | OXYGEN SATURATION: 96 % | SYSTOLIC BLOOD PRESSURE: 157 MMHG | TEMPERATURE: 98 F | DIASTOLIC BLOOD PRESSURE: 89 MMHG | HEART RATE: 98 BPM

## 2017-09-06 DIAGNOSIS — Z98.890 OTHER SPECIFIED POSTPROCEDURAL STATES: Chronic | ICD-10-CM

## 2017-09-06 DIAGNOSIS — Z12.11 ENCOUNTER FOR SCREENING FOR MALIGNANT NEOPLASM OF COLON: Chronic | ICD-10-CM

## 2017-09-06 LAB
AMORPH CRY # UR COMP ASSIST: SIGNIFICANT CHANGE UP (ref 0–0)
APPEARANCE UR: CLEAR — SIGNIFICANT CHANGE UP
BACTERIA # UR AUTO: SIGNIFICANT CHANGE UP
BASOPHILS # BLD AUTO: 0.04 K/UL — SIGNIFICANT CHANGE UP (ref 0–0.2)
BASOPHILS NFR BLD AUTO: 0.3 % — SIGNIFICANT CHANGE UP (ref 0–2)
BILIRUB UR-MCNC: NEGATIVE — SIGNIFICANT CHANGE UP
BLOOD UR QL VISUAL: HIGH
BUN SERPL-MCNC: 18 MG/DL — SIGNIFICANT CHANGE UP (ref 7–23)
CALCIUM SERPL-MCNC: 9.5 MG/DL — SIGNIFICANT CHANGE UP (ref 8.4–10.5)
CHLORIDE SERPL-SCNC: 99 MMOL/L — SIGNIFICANT CHANGE UP (ref 98–107)
CO2 SERPL-SCNC: 23 MMOL/L — SIGNIFICANT CHANGE UP (ref 22–31)
COLOR SPEC: SIGNIFICANT CHANGE UP
CREAT SERPL-MCNC: 1.09 MG/DL — SIGNIFICANT CHANGE UP (ref 0.5–1.3)
EOSINOPHIL # BLD AUTO: 0.14 K/UL — SIGNIFICANT CHANGE UP (ref 0–0.5)
EOSINOPHIL NFR BLD AUTO: 1 % — SIGNIFICANT CHANGE UP (ref 0–6)
GLUCOSE SERPL-MCNC: 158 MG/DL — HIGH (ref 70–99)
GLUCOSE UR-MCNC: NEGATIVE — SIGNIFICANT CHANGE UP
HCT VFR BLD CALC: 44.7 % — SIGNIFICANT CHANGE UP (ref 39–50)
HGB BLD-MCNC: 15 G/DL — SIGNIFICANT CHANGE UP (ref 13–17)
IMM GRANULOCYTES # BLD AUTO: 0.07 # — SIGNIFICANT CHANGE UP
IMM GRANULOCYTES NFR BLD AUTO: 0.5 % — SIGNIFICANT CHANGE UP (ref 0–1.5)
KETONES UR-MCNC: NEGATIVE — SIGNIFICANT CHANGE UP
LEUKOCYTE ESTERASE UR-ACNC: HIGH
LYMPHOCYTES # BLD AUTO: 1.69 K/UL — SIGNIFICANT CHANGE UP (ref 1–3.3)
LYMPHOCYTES # BLD AUTO: 12.6 % — LOW (ref 13–44)
MCHC RBC-ENTMCNC: 29.6 PG — SIGNIFICANT CHANGE UP (ref 27–34)
MCHC RBC-ENTMCNC: 33.6 % — SIGNIFICANT CHANGE UP (ref 32–36)
MCV RBC AUTO: 88.3 FL — SIGNIFICANT CHANGE UP (ref 80–100)
MONOCYTES # BLD AUTO: 1 K/UL — HIGH (ref 0–0.9)
MONOCYTES NFR BLD AUTO: 7.5 % — SIGNIFICANT CHANGE UP (ref 2–14)
NEUTROPHILS # BLD AUTO: 10.45 K/UL — HIGH (ref 1.8–7.4)
NEUTROPHILS NFR BLD AUTO: 78.1 % — HIGH (ref 43–77)
NITRITE UR-MCNC: NEGATIVE — SIGNIFICANT CHANGE UP
NRBC # FLD: 0 — SIGNIFICANT CHANGE UP
PH UR: 6 — SIGNIFICANT CHANGE UP (ref 4.6–8)
PLATELET # BLD AUTO: 237 K/UL — SIGNIFICANT CHANGE UP (ref 150–400)
PMV BLD: 10 FL — SIGNIFICANT CHANGE UP (ref 7–13)
POTASSIUM SERPL-MCNC: 4.2 MMOL/L — SIGNIFICANT CHANGE UP (ref 3.5–5.3)
POTASSIUM SERPL-SCNC: 4.2 MMOL/L — SIGNIFICANT CHANGE UP (ref 3.5–5.3)
PROT UR-MCNC: 10 — SIGNIFICANT CHANGE UP
RBC # BLD: 5.06 M/UL — SIGNIFICANT CHANGE UP (ref 4.2–5.8)
RBC # FLD: 13.4 % — SIGNIFICANT CHANGE UP (ref 10.3–14.5)
RBC CASTS # UR COMP ASSIST: >50 — HIGH (ref 0–?)
SODIUM SERPL-SCNC: 138 MMOL/L — SIGNIFICANT CHANGE UP (ref 135–145)
SP GR SPEC: 1.01 — SIGNIFICANT CHANGE UP (ref 1–1.03)
SQUAMOUS # UR AUTO: SIGNIFICANT CHANGE UP
UROBILINOGEN FLD QL: NORMAL E.U. — SIGNIFICANT CHANGE UP (ref 0.1–0.2)
WBC # BLD: 13.39 K/UL — HIGH (ref 3.8–10.5)
WBC # FLD AUTO: 13.39 K/UL — HIGH (ref 3.8–10.5)
WBC CLUMPS #/AREA URNS HPF: PRESENT — HIGH (ref 0–?)
WBC UR QL: SIGNIFICANT CHANGE UP (ref 0–?)
YEAST BUDDING # UR COMP ASSIST: SIGNIFICANT CHANGE UP

## 2017-09-06 PROCEDURE — 99284 EMERGENCY DEPT VISIT MOD MDM: CPT | Mod: 25

## 2017-09-06 RX ORDER — CIPROFLOXACIN LACTATE 400MG/40ML
500 VIAL (ML) INTRAVENOUS ONCE
Qty: 0 | Refills: 0 | Status: COMPLETED | OUTPATIENT
Start: 2017-09-06 | End: 2017-09-06

## 2017-09-06 RX ORDER — OXYBUTYNIN CHLORIDE 5 MG
1 TABLET ORAL
Qty: 15 | Refills: 0 | OUTPATIENT
Start: 2017-09-06 | End: 2017-09-11

## 2017-09-06 RX ORDER — MOXIFLOXACIN HYDROCHLORIDE TABLETS, 400 MG 400 MG/1
1 TABLET, FILM COATED ORAL
Qty: 14 | Refills: 0 | OUTPATIENT
Start: 2017-09-06 | End: 2017-09-13

## 2017-09-06 RX ADMIN — Medication 500 MILLIGRAM(S): at 04:11

## 2017-09-06 NOTE — ED PROVIDER NOTE - PMH
Asthma  h/o childhood  BPH (benign prostatic hypertrophy)    Diabetes mellitus  type II  Dyslipidemia    Inguinal hernia  Left  Intracranial hemorrhage  h/o 20 yrs ago  Obesity    Obstructive sleep apnea  on CPAP at 13  PVD (peripheral vascular disease)    Umbilical hernia

## 2017-09-06 NOTE — ED PROVIDER NOTE - PSH
Encounter for screening colonoscopy    H/O eye surgery  removal of right eye pigmentation >35 years ago  H/O left inguinal hernia repair  6/2017  H/O right inguinal hernia repair  8/2015

## 2017-09-06 NOTE — ED PROVIDER NOTE - SECONDARY DIAGNOSIS.
Urinary tract infection associated with indwelling urethral catheter, initial encounter Urinary retention

## 2017-09-06 NOTE — ED PROVIDER NOTE - ATTENDING CONTRIBUTION TO CARE
MD Wahl:  patient seen and evaluated with the resident.  I was present for key portions of the History & Physical, and I agree with the Impression & Plan.  MD Wahl:  71 yo M, c/o no ba catheter output in last 8 hrs.  Ba placed in Urology clinic ~ 8hrs ago.  has known BPH; scheduled to have robotic prostatectomy with Dr. Solis in , 2 weeks.  Quality: Has had urinary obstruction before, and this feels the same.  Intensity:  9/10.  Better:  none.  Worse:  suprapubic TTP.  VS - wnl.  Physical Exam: adult M, uncomfortable appearing, NCAT.  RRR.  CTA B.  Abd:  s, +lower abd distension and TTP.  Gu:  ba catheter in place with urine leaking around tube, no output into bag.  no bleeding.  Ba catheter flushed with 5cc NS, using sterile technique, and 1200 cc clear urine returned.  Case d/w Urology PA, who placed ba in patient earlier today.  Plan:  if creat wnl --> d/c home, f/u Urology, return precautions.  If UA evidence of UTI will Tx with Abx, but still d/c home.

## 2017-09-06 NOTE — ED ADULT NURSE REASSESSMENT NOTE - NS ED NURSE REASSESS COMMENT FT1
pt a&ox3, breathing unlabored, MD Wahl flushed South and pt was able to put out 1L urine, pain relieved. Will continue to monitor.

## 2017-09-06 NOTE — ED PROVIDER NOTE - MEDICAL DECISION MAKING DETAILS
MD Wahl:  71 yo M, c/o no ba catheter output in last 8 hrs.  Ba placed in Urology clinic ~ 8hrs ago.  has known BPH; scheduled to have robotic prostatectomy with Dr. Solis in , 2 weeks.  Quality: Has had urinary obstruction before, and this feels the same.  Intensity:  9/10.  Better:  none.  Worse:  suprapubic TTP.  VS - wnl.  Physical Exam: adult M, uncomfortable appearing, NCAT.  RRR.  CTA B.  Abd:  s, +lower abd distension and TTP.  Gu:  ba catheter in place with urine leaking around tube, no output into bag.  no bleeding.  Ba catheter flushed with 5cc NS, using sterile technique, and 1200 cc clear urine returned.  Case d/w Urology PA, who placed ba in patient earlier today.  Plan:  if creat wnl --> d/c home, f/u Urology, return precautions.  If UA evidence of UTI will Tx with Abx, but still d/c home.

## 2017-09-06 NOTE — ED PROVIDER NOTE - PHYSICAL EXAMINATION
VS - wnl.  Physical Exam: adult M, uncomfortable appearing, NCAT.  RRR.  CTA B.  Abd:  s, +lower abd distension and TTP.  Gu:  ba catheter in place with urine leaking around tube, no output into bag.  no bleeding.  Ba catheter flushed with 5cc NS, using sterile technique, and 1200 cc clear urine returned.

## 2017-09-06 NOTE — ED ADULT NURSE NOTE - OBJECTIVE STATEMENT
Patient bought to room 9 via wheelchair, appears uncomfortable, with complaints of urinary retention, South 16 FR placed yesterday by his urologist, started noticing blood clots in the South with abdominal pain. Patient reports that last void was five hours ago, abdomen firm, distended, blood- tinged urine noted in South. 20 gauge saline lock placed on right AC, blood drawn and sent. Will follow up and monitor.

## 2017-09-06 NOTE — ED ADULT TRIAGE NOTE - CHIEF COMPLAINT QUOTE
" I have seen blood clots in my South. " Patient reports that his South has been changed yesterday by his urologist and has not urinate for the past five hours. , also reports abdominal pain. Patient appears uncomfortable in triage, blood tinged urine noted in South catheter.

## 2017-09-06 NOTE — ED PROVIDER NOTE - OBJECTIVE STATEMENT
MD Wahl:  71 yo M, c/o no ba catheter output in last 8 hrs.  Ba placed in Urology clinic ~ 8hrs ago.  has known BPH; scheduled to have robotic prostatectomy with Dr. Solis in , 2 weeks.  Quality: Has had urinary obstruction before, and this feels the same.  Intensity:  9/10.  Better:  none.  Worse:  suprapubic TTP.

## 2017-09-06 NOTE — ED PROVIDER NOTE - CARE PLAN
Principal Discharge DX:	South catheter problem, initial encounter  Secondary Diagnosis:	Urinary tract infection associated with indwelling urethral catheter, initial encounter Principal Discharge DX:	South catheter problem, initial encounter  Secondary Diagnosis:	Urinary tract infection associated with indwelling urethral catheter, initial encounter  Secondary Diagnosis:	Urinary retention

## 2017-09-07 LAB
BACTERIA UR CULT: SIGNIFICANT CHANGE UP
SPECIMEN SOURCE: SIGNIFICANT CHANGE UP

## 2017-09-08 LAB — BACTERIA UR CULT: NORMAL

## 2017-09-11 DIAGNOSIS — R33.9 RETENTION OF URINE, UNSPECIFIED: ICD-10-CM

## 2017-09-14 ENCOUNTER — OUTPATIENT (OUTPATIENT)
Dept: OUTPATIENT SERVICES | Facility: HOSPITAL | Age: 72
LOS: 1 days | End: 2017-09-14
Payer: MEDICARE

## 2017-09-14 VITALS
WEIGHT: 285.06 LBS | OXYGEN SATURATION: 96 % | HEIGHT: 72 IN | DIASTOLIC BLOOD PRESSURE: 82 MMHG | HEART RATE: 74 BPM | SYSTOLIC BLOOD PRESSURE: 140 MMHG | TEMPERATURE: 98 F | RESPIRATION RATE: 16 BRPM

## 2017-09-14 DIAGNOSIS — Z98.890 OTHER SPECIFIED POSTPROCEDURAL STATES: Chronic | ICD-10-CM

## 2017-09-14 DIAGNOSIS — Z12.11 ENCOUNTER FOR SCREENING FOR MALIGNANT NEOPLASM OF COLON: Chronic | ICD-10-CM

## 2017-09-14 DIAGNOSIS — N40.0 BENIGN PROSTATIC HYPERPLASIA WITHOUT LOWER URINARY TRACT SYMPTOMS: ICD-10-CM

## 2017-09-14 LAB
APPEARANCE UR: CLEAR — SIGNIFICANT CHANGE UP
BACTERIA # UR AUTO: SIGNIFICANT CHANGE UP
BILIRUB UR-MCNC: NEGATIVE — SIGNIFICANT CHANGE UP
BLD GP AB SCN SERPL QL: NEGATIVE — SIGNIFICANT CHANGE UP
BLOOD UR QL VISUAL: NEGATIVE — SIGNIFICANT CHANGE UP
BUN SERPL-MCNC: 23 MG/DL — SIGNIFICANT CHANGE UP (ref 7–23)
CALCIUM SERPL-MCNC: 9.5 MG/DL — SIGNIFICANT CHANGE UP (ref 8.4–10.5)
CHLORIDE SERPL-SCNC: 99 MMOL/L — SIGNIFICANT CHANGE UP (ref 98–107)
CO2 SERPL-SCNC: 26 MMOL/L — SIGNIFICANT CHANGE UP (ref 22–31)
COLOR SPEC: SIGNIFICANT CHANGE UP
CREAT SERPL-MCNC: 1.11 MG/DL — SIGNIFICANT CHANGE UP (ref 0.5–1.3)
GLUCOSE SERPL-MCNC: 94 MG/DL — SIGNIFICANT CHANGE UP (ref 70–99)
GLUCOSE UR-MCNC: NEGATIVE — SIGNIFICANT CHANGE UP
HBA1C BLD-MCNC: 6.5 % — HIGH (ref 4–5.6)
HCT VFR BLD CALC: 43 % — SIGNIFICANT CHANGE UP (ref 39–50)
HGB BLD-MCNC: 14.2 G/DL — SIGNIFICANT CHANGE UP (ref 13–17)
KETONES UR-MCNC: NEGATIVE — SIGNIFICANT CHANGE UP
LEUKOCYTE ESTERASE UR-ACNC: NEGATIVE — SIGNIFICANT CHANGE UP
MCHC RBC-ENTMCNC: 29.6 PG — SIGNIFICANT CHANGE UP (ref 27–34)
MCHC RBC-ENTMCNC: 33 % — SIGNIFICANT CHANGE UP (ref 32–36)
MCV RBC AUTO: 89.8 FL — SIGNIFICANT CHANGE UP (ref 80–100)
MUCOUS THREADS # UR AUTO: SIGNIFICANT CHANGE UP
NITRITE UR-MCNC: NEGATIVE — SIGNIFICANT CHANGE UP
NON-SQ EPI CELLS # UR AUTO: <1 — SIGNIFICANT CHANGE UP
NRBC # FLD: 0 — SIGNIFICANT CHANGE UP
PH UR: 6.5 — SIGNIFICANT CHANGE UP (ref 4.6–8)
PLATELET # BLD AUTO: 234 K/UL — SIGNIFICANT CHANGE UP (ref 150–400)
PMV BLD: 10 FL — SIGNIFICANT CHANGE UP (ref 7–13)
POTASSIUM SERPL-MCNC: 4.2 MMOL/L — SIGNIFICANT CHANGE UP (ref 3.5–5.3)
POTASSIUM SERPL-SCNC: 4.2 MMOL/L — SIGNIFICANT CHANGE UP (ref 3.5–5.3)
PROT UR-MCNC: NEGATIVE — SIGNIFICANT CHANGE UP
RBC # BLD: 4.79 M/UL — SIGNIFICANT CHANGE UP (ref 4.2–5.8)
RBC # FLD: 13.3 % — SIGNIFICANT CHANGE UP (ref 10.3–14.5)
RBC CASTS # UR COMP ASSIST: SIGNIFICANT CHANGE UP (ref 0–?)
RH IG SCN BLD-IMP: POSITIVE — SIGNIFICANT CHANGE UP
SODIUM SERPL-SCNC: 139 MMOL/L — SIGNIFICANT CHANGE UP (ref 135–145)
SP GR SPEC: 1.01 — SIGNIFICANT CHANGE UP (ref 1–1.03)
SQUAMOUS # UR AUTO: SIGNIFICANT CHANGE UP
UROBILINOGEN FLD QL: NORMAL E.U. — SIGNIFICANT CHANGE UP (ref 0.1–0.2)
WBC # BLD: 8.55 K/UL — SIGNIFICANT CHANGE UP (ref 3.8–10.5)
WBC # FLD AUTO: 8.55 K/UL — SIGNIFICANT CHANGE UP (ref 3.8–10.5)
WBC UR QL: SIGNIFICANT CHANGE UP (ref 0–?)

## 2017-09-14 PROCEDURE — 93010 ELECTROCARDIOGRAM REPORT: CPT

## 2017-09-14 RX ORDER — SODIUM CHLORIDE 9 MG/ML
1000 INJECTION, SOLUTION INTRAVENOUS
Qty: 0 | Refills: 0 | Status: DISCONTINUED | OUTPATIENT
Start: 2017-10-02 | End: 2017-10-02

## 2017-09-14 NOTE — H&P PST ADULT - NEGATIVE OPHTHALMOLOGIC SYMPTOMS
no pain L/no discharge L/no blurred vision R/no loss of vision R/no diplopia/no blurred vision L/no discharge R/no loss of vision L

## 2017-09-14 NOTE — H&P PST ADULT - GASTROINTESTINAL DETAILS
no guarding/no organomegaly/soft/no rebound tenderness/bowel sounds normal/no rigidity/no bruit/nontender/no distention/no masses palpable

## 2017-09-14 NOTE — H&P PST ADULT - HISTORY OF PRESENT ILLNESS
Pt is a 72 yr old male scheduled for Robotic suprapubic Prostatectomy with Dr Castillo on 10/2/17 including umbilical hernia repair with Dr Temple. Pt s/p left inguinal hernia repair in 6/2017, reports urinary retention after surgery requiring ba catheter placement. Pt had since then had the ba catheter in place and was changed at least 5 times. Denies hematuria, dysuria, flank pain, or fevers. Procedure was delayed from 8/2017 due to UTI which was treated with antibiotic

## 2017-09-14 NOTE — H&P PST ADULT - RS GEN PE MLT RESP DETAILS PC
clear to auscultation bilaterally/no intercostal retractions/no subcutaneous emphysema/no chest wall tenderness/no rhonchi/no rales/no wheezes/respirations non-labored/airway patent/breath sounds equal/good air movement

## 2017-09-14 NOTE — H&P PST ADULT - PROBLEM SELECTOR PLAN 1
Pt is scheduled for robotic assisted suprapubic prostatectomy and repair of umbilical hernia for 10/2/17. Pt given pre-op instruction and Famotidine and chlorhexidine and verbalized understanding. OR Booking notified of Sleep apnea. Instructed to bring mask to hospital Pt is scheduled for robotic assisted suprapubic prostatectomy and repair of umbilical hernia for 10/2/17. Pt given pre-op instruction and Famotidine and chlorhexidine and verbalized understanding. OR Booking notified of Sleep apnea. Instructed to bring mask to hospital. Pending medical clearance. Instructed to make an appt with PMD.

## 2017-09-14 NOTE — H&P PST ADULT - NEGATIVE NEUROLOGICAL SYMPTOMS
no weakness/no vertigo/no loss of sensation/no difficulty walking/no confusion/no generalized seizures/no tremors/no headache/no transient paralysis/no paresthesias/no focal seizures/no syncope

## 2017-09-14 NOTE — H&P PST ADULT - NEGATIVE CARDIOVASCULAR SYMPTOMS
no chest pain/no palpitations/no paroxysmal nocturnal dyspnea/no claudication/no orthopnea/no dyspnea on exertion

## 2017-09-14 NOTE — H&P PST ADULT - PMH
Asthma  h/o childhood  BPH (benign prostatic hypertrophy)    Diabetes mellitus  type II  Dyslipidemia    Inguinal hernia  Left  Intracranial hemorrhage  h/o 20 yrs ago  Obesity    Obstructive sleep apnea  on CPAP at 13  PVD (peripheral vascular disease)    Umbilical hernia    Umbilical hernia Asthma  h/o childhood  BPH (benign prostatic hypertrophy)    Diabetes mellitus  type II  Dyslipidemia    Inguinal hernia  Left  Intracranial hemorrhage  h/o 20 yrs ago  Obesity    Obstructive sleep apnea  on CPAP at 13  PVD (peripheral vascular disease)    Umbilical hernia

## 2017-09-14 NOTE — H&P PST ADULT - MUSCULOSKELETAL
detailed exam normal strength/no joint warmth/ROM intact/no calf tenderness/no joint swelling/no joint erythema details…

## 2017-09-14 NOTE — H&P PST ADULT - NEGATIVE GENERAL GENITOURINARY SYMPTOMS
no flank pain R/no urine discoloration/no bladder infections/no nocturia/no flank pain L/no dysuria/no hematuria

## 2017-09-14 NOTE — H&P PST ADULT - VISION (WITH CORRECTIVE LENSES IF THE PATIENT USUALLY WEARS THEM):
Normal vision: sees adequately in most situations; can see medication labels, newsprint/Pt uses glasses

## 2017-09-14 NOTE — H&P PST ADULT - NEGATIVE ENMT SYMPTOMS
no throat pain/no dysphagia/no hearing difficulty/no vertigo/no sinus symptoms/no tinnitus/no ear pain/no nasal congestion/no nasal discharge

## 2017-09-14 NOTE — H&P PST ADULT - NEGATIVE GENERAL SYMPTOMS
no fever/no malaise/no polyuria/no weight loss/no polyphagia/no polydipsia/no fatigue/no chills/no sweating/no anorexia

## 2017-09-18 LAB
METHOD TYPE: SIGNIFICANT CHANGE UP
ORGANISM # SPEC MICROSCOPIC CNT: SIGNIFICANT CHANGE UP
ORGANISM # SPEC MICROSCOPIC CNT: SIGNIFICANT CHANGE UP
SPECIMEN SOURCE: SIGNIFICANT CHANGE UP

## 2017-09-19 LAB
-  AMIKACIN: SIGNIFICANT CHANGE UP
-  AZTREONAM: SIGNIFICANT CHANGE UP
-  CEFEPIME: SIGNIFICANT CHANGE UP
-  CEFTAZIDIME: SIGNIFICANT CHANGE UP
-  CIPROFLOXACIN: SIGNIFICANT CHANGE UP
-  GENTAMICIN: SIGNIFICANT CHANGE UP
-  IMIPENEM: SIGNIFICANT CHANGE UP
-  LEVOFLOXACIN: SIGNIFICANT CHANGE UP
-  MEROPENEM: SIGNIFICANT CHANGE UP
-  PIPERACILLIN/TAZOBACTAM: SIGNIFICANT CHANGE UP
-  TOBRAMYCIN: SIGNIFICANT CHANGE UP
-  TRIMETHOPRIM/SULFAMETHOXAZOLE: SIGNIFICANT CHANGE UP
BACTERIA UR CULT: SIGNIFICANT CHANGE UP

## 2017-10-02 ENCOUNTER — APPOINTMENT (OUTPATIENT)
Dept: SURGERY | Facility: HOSPITAL | Age: 72
End: 2017-10-02

## 2017-10-02 ENCOUNTER — APPOINTMENT (OUTPATIENT)
Dept: UROLOGY | Facility: HOSPITAL | Age: 72
End: 2017-10-02

## 2017-10-02 ENCOUNTER — RESULT REVIEW (OUTPATIENT)
Age: 72
End: 2017-10-02

## 2017-10-02 ENCOUNTER — INPATIENT (INPATIENT)
Facility: HOSPITAL | Age: 72
LOS: 5 days | Discharge: HOME | End: 2017-10-08
Attending: UROLOGY | Admitting: UROLOGY
Payer: MEDICARE

## 2017-10-02 VITALS
HEIGHT: 72 IN | WEIGHT: 285.06 LBS | OXYGEN SATURATION: 96 % | HEART RATE: 74 BPM | TEMPERATURE: 98 F | SYSTOLIC BLOOD PRESSURE: 113 MMHG | RESPIRATION RATE: 16 BRPM | DIASTOLIC BLOOD PRESSURE: 68 MMHG

## 2017-10-02 DIAGNOSIS — Z98.890 OTHER SPECIFIED POSTPROCEDURAL STATES: Chronic | ICD-10-CM

## 2017-10-02 DIAGNOSIS — Z12.11 ENCOUNTER FOR SCREENING FOR MALIGNANT NEOPLASM OF COLON: Chronic | ICD-10-CM

## 2017-10-02 DIAGNOSIS — N40.0 BENIGN PROSTATIC HYPERPLASIA WITHOUT LOWER URINARY TRACT SYMPTOMS: ICD-10-CM

## 2017-10-02 LAB
BASOPHILS # BLD AUTO: 0.03 K/UL — SIGNIFICANT CHANGE UP (ref 0–0.2)
BASOPHILS NFR BLD AUTO: 0.2 % — SIGNIFICANT CHANGE UP (ref 0–2)
BUN SERPL-MCNC: 19 MG/DL — SIGNIFICANT CHANGE UP (ref 7–23)
BUN SERPL-MCNC: 19 MG/DL — SIGNIFICANT CHANGE UP (ref 7–23)
CALCIUM SERPL-MCNC: 7.8 MG/DL — LOW (ref 8.4–10.5)
CALCIUM SERPL-MCNC: 7.9 MG/DL — LOW (ref 8.4–10.5)
CHLORIDE SERPL-SCNC: 100 MMOL/L — SIGNIFICANT CHANGE UP (ref 98–107)
CHLORIDE SERPL-SCNC: 101 MMOL/L — SIGNIFICANT CHANGE UP (ref 98–107)
CO2 SERPL-SCNC: 26 MMOL/L — SIGNIFICANT CHANGE UP (ref 22–31)
CO2 SERPL-SCNC: 27 MMOL/L — SIGNIFICANT CHANGE UP (ref 22–31)
CREAT SERPL-MCNC: 1.41 MG/DL — HIGH (ref 0.5–1.3)
CREAT SERPL-MCNC: 1.47 MG/DL — HIGH (ref 0.5–1.3)
EOSINOPHIL # BLD AUTO: 0.01 K/UL — SIGNIFICANT CHANGE UP (ref 0–0.5)
EOSINOPHIL NFR BLD AUTO: 0.1 % — SIGNIFICANT CHANGE UP (ref 0–6)
GLUCOSE SERPL-MCNC: 205 MG/DL — HIGH (ref 70–99)
GLUCOSE SERPL-MCNC: 218 MG/DL — HIGH (ref 70–99)
HCT VFR BLD CALC: 40.8 % — SIGNIFICANT CHANGE UP (ref 39–50)
HGB BLD-MCNC: 13.5 G/DL — SIGNIFICANT CHANGE UP (ref 13–17)
IMM GRANULOCYTES # BLD AUTO: 0.07 # — SIGNIFICANT CHANGE UP
IMM GRANULOCYTES NFR BLD AUTO: 0.4 % — SIGNIFICANT CHANGE UP (ref 0–1.5)
LYMPHOCYTES # BLD AUTO: 1.13 K/UL — SIGNIFICANT CHANGE UP (ref 1–3.3)
LYMPHOCYTES # BLD AUTO: 6.2 % — LOW (ref 13–44)
MCHC RBC-ENTMCNC: 29.8 PG — SIGNIFICANT CHANGE UP (ref 27–34)
MCHC RBC-ENTMCNC: 33.1 % — SIGNIFICANT CHANGE UP (ref 32–36)
MCV RBC AUTO: 90.1 FL — SIGNIFICANT CHANGE UP (ref 80–100)
MONOCYTES # BLD AUTO: 0.9 K/UL — SIGNIFICANT CHANGE UP (ref 0–0.9)
MONOCYTES NFR BLD AUTO: 4.9 % — SIGNIFICANT CHANGE UP (ref 2–14)
NEUTROPHILS # BLD AUTO: 16.11 K/UL — HIGH (ref 1.8–7.4)
NEUTROPHILS NFR BLD AUTO: 88.2 % — HIGH (ref 43–77)
NRBC # FLD: 0 — SIGNIFICANT CHANGE UP
PLATELET # BLD AUTO: 235 K/UL — SIGNIFICANT CHANGE UP (ref 150–400)
PMV BLD: 9.2 FL — SIGNIFICANT CHANGE UP (ref 7–13)
POTASSIUM SERPL-MCNC: 5.6 MMOL/L — HIGH (ref 3.5–5.3)
POTASSIUM SERPL-MCNC: 5.9 MMOL/L — HIGH (ref 3.5–5.3)
POTASSIUM SERPL-SCNC: 5.6 MMOL/L — HIGH (ref 3.5–5.3)
POTASSIUM SERPL-SCNC: 5.9 MMOL/L — HIGH (ref 3.5–5.3)
RBC # BLD: 4.53 M/UL — SIGNIFICANT CHANGE UP (ref 4.2–5.8)
RBC # FLD: 13.7 % — SIGNIFICANT CHANGE UP (ref 10.3–14.5)
SODIUM SERPL-SCNC: 137 MMOL/L — SIGNIFICANT CHANGE UP (ref 135–145)
SODIUM SERPL-SCNC: 138 MMOL/L — SIGNIFICANT CHANGE UP (ref 135–145)
WBC # BLD: 18.25 K/UL — HIGH (ref 3.8–10.5)
WBC # FLD AUTO: 18.25 K/UL — HIGH (ref 3.8–10.5)

## 2017-10-02 PROCEDURE — 88307 TISSUE EXAM BY PATHOLOGIST: CPT | Mod: 26

## 2017-10-02 PROCEDURE — 49585: CPT

## 2017-10-02 PROCEDURE — 55821 REMOVAL OF PROSTATE: CPT

## 2017-10-02 RX ORDER — GLUCAGON INJECTION, SOLUTION 0.5 MG/.1ML
1 INJECTION, SOLUTION SUBCUTANEOUS ONCE
Qty: 0 | Refills: 0 | Status: DISCONTINUED | OUTPATIENT
Start: 2017-10-02 | End: 2017-10-08

## 2017-10-02 RX ORDER — ACETAMINOPHEN 500 MG
650 TABLET ORAL EVERY 6 HOURS
Qty: 0 | Refills: 0 | Status: DISCONTINUED | OUTPATIENT
Start: 2017-10-02 | End: 2017-10-04

## 2017-10-02 RX ORDER — SODIUM CHLORIDE 9 MG/ML
1000 INJECTION INTRAMUSCULAR; INTRAVENOUS; SUBCUTANEOUS ONCE
Qty: 0 | Refills: 0 | Status: COMPLETED | OUTPATIENT
Start: 2017-10-02 | End: 2017-10-02

## 2017-10-02 RX ORDER — DEXTROSE 50 % IN WATER 50 %
1 SYRINGE (ML) INTRAVENOUS ONCE
Qty: 0 | Refills: 0 | Status: DISCONTINUED | OUTPATIENT
Start: 2017-10-02 | End: 2017-10-08

## 2017-10-02 RX ORDER — INSULIN LISPRO 100/ML
VIAL (ML) SUBCUTANEOUS
Qty: 0 | Refills: 0 | Status: DISCONTINUED | OUTPATIENT
Start: 2017-10-02 | End: 2017-10-08

## 2017-10-02 RX ORDER — FUROSEMIDE 40 MG
10 TABLET ORAL ONCE
Qty: 0 | Refills: 0 | Status: COMPLETED | OUTPATIENT
Start: 2017-10-02 | End: 2017-10-02

## 2017-10-02 RX ORDER — GENTAMICIN SULFATE 40 MG/ML
80 VIAL (ML) INJECTION EVERY 8 HOURS
Qty: 0 | Refills: 0 | Status: DISCONTINUED | OUTPATIENT
Start: 2017-10-02 | End: 2017-10-03

## 2017-10-02 RX ORDER — MORPHINE SULFATE 50 MG/1
4 CAPSULE, EXTENDED RELEASE ORAL EVERY 4 HOURS
Qty: 0 | Refills: 0 | Status: DISCONTINUED | OUTPATIENT
Start: 2017-10-02 | End: 2017-10-04

## 2017-10-02 RX ORDER — FLUTICASONE PROPIONATE 50 MCG
1 SPRAY, SUSPENSION NASAL DAILY
Qty: 0 | Refills: 0 | Status: DISCONTINUED | OUTPATIENT
Start: 2017-10-02 | End: 2017-10-08

## 2017-10-02 RX ORDER — HYDROMORPHONE HYDROCHLORIDE 2 MG/ML
0.5 INJECTION INTRAMUSCULAR; INTRAVENOUS; SUBCUTANEOUS
Qty: 0 | Refills: 0 | Status: DISCONTINUED | OUTPATIENT
Start: 2017-10-02 | End: 2017-10-02

## 2017-10-02 RX ORDER — METOCLOPRAMIDE HCL 10 MG
10 TABLET ORAL EVERY 6 HOURS
Qty: 0 | Refills: 0 | Status: DISCONTINUED | OUTPATIENT
Start: 2017-10-02 | End: 2017-10-08

## 2017-10-02 RX ORDER — SODIUM CHLORIDE 9 MG/ML
1000 INJECTION, SOLUTION INTRAVENOUS
Qty: 0 | Refills: 0 | Status: DISCONTINUED | OUTPATIENT
Start: 2017-10-02 | End: 2017-10-08

## 2017-10-02 RX ORDER — DOCUSATE SODIUM 100 MG
100 CAPSULE ORAL THREE TIMES A DAY
Qty: 0 | Refills: 0 | Status: DISCONTINUED | OUTPATIENT
Start: 2017-10-02 | End: 2017-10-04

## 2017-10-02 RX ORDER — SODIUM CHLORIDE 9 MG/ML
1000 INJECTION, SOLUTION INTRAVENOUS
Qty: 0 | Refills: 0 | Status: DISCONTINUED | OUTPATIENT
Start: 2017-10-02 | End: 2017-10-03

## 2017-10-02 RX ORDER — OXYBUTYNIN CHLORIDE 5 MG
5 TABLET ORAL THREE TIMES A DAY
Qty: 0 | Refills: 0 | Status: DISCONTINUED | OUTPATIENT
Start: 2017-10-02 | End: 2017-10-02

## 2017-10-02 RX ORDER — INFLUENZA VIRUS VACCINE 15; 15; 15; 15 UG/.5ML; UG/.5ML; UG/.5ML; UG/.5ML
0.5 SUSPENSION INTRAMUSCULAR ONCE
Qty: 0 | Refills: 0 | Status: COMPLETED | OUTPATIENT
Start: 2017-10-02 | End: 2017-10-08

## 2017-10-02 RX ORDER — DEXTROSE 50 % IN WATER 50 %
12.5 SYRINGE (ML) INTRAVENOUS ONCE
Qty: 0 | Refills: 0 | Status: DISCONTINUED | OUTPATIENT
Start: 2017-10-02 | End: 2017-10-08

## 2017-10-02 RX ORDER — OXYBUTYNIN CHLORIDE 5 MG
5 TABLET ORAL THREE TIMES A DAY
Qty: 0 | Refills: 0 | Status: DISCONTINUED | OUTPATIENT
Start: 2017-10-02 | End: 2017-10-03

## 2017-10-02 RX ORDER — VANCOMYCIN HCL 1 G
1500 VIAL (EA) INTRAVENOUS EVERY 12 HOURS
Qty: 0 | Refills: 0 | Status: DISCONTINUED | OUTPATIENT
Start: 2017-10-02 | End: 2017-10-03

## 2017-10-02 RX ORDER — HEPARIN SODIUM 5000 [USP'U]/ML
5000 INJECTION INTRAVENOUS; SUBCUTANEOUS EVERY 8 HOURS
Qty: 0 | Refills: 0 | Status: DISCONTINUED | OUTPATIENT
Start: 2017-10-02 | End: 2017-10-08

## 2017-10-02 RX ORDER — ALBUTEROL 90 UG/1
2.5 AEROSOL, METERED ORAL ONCE
Qty: 0 | Refills: 0 | Status: DISCONTINUED | OUTPATIENT
Start: 2017-10-02 | End: 2017-10-03

## 2017-10-02 RX ORDER — ONDANSETRON 8 MG/1
4 TABLET, FILM COATED ORAL ONCE
Qty: 0 | Refills: 0 | Status: DISCONTINUED | OUTPATIENT
Start: 2017-10-02 | End: 2017-10-02

## 2017-10-02 RX ORDER — OXYCODONE AND ACETAMINOPHEN 5; 325 MG/1; MG/1
1 TABLET ORAL EVERY 4 HOURS
Qty: 0 | Refills: 0 | Status: DISCONTINUED | OUTPATIENT
Start: 2017-10-02 | End: 2017-10-04

## 2017-10-02 RX ORDER — ONDANSETRON 8 MG/1
4 TABLET, FILM COATED ORAL EVERY 8 HOURS
Qty: 0 | Refills: 0 | Status: DISCONTINUED | OUTPATIENT
Start: 2017-10-02 | End: 2017-10-08

## 2017-10-02 RX ORDER — INSULIN LISPRO 100/ML
VIAL (ML) SUBCUTANEOUS AT BEDTIME
Qty: 0 | Refills: 0 | Status: DISCONTINUED | OUTPATIENT
Start: 2017-10-02 | End: 2017-10-08

## 2017-10-02 RX ORDER — SENNA PLUS 8.6 MG/1
2 TABLET ORAL AT BEDTIME
Qty: 0 | Refills: 0 | Status: DISCONTINUED | OUTPATIENT
Start: 2017-10-02 | End: 2017-10-04

## 2017-10-02 RX ADMIN — Medication: at 17:14

## 2017-10-02 RX ADMIN — Medication 100 MILLIGRAM(S): at 23:50

## 2017-10-02 RX ADMIN — Medication 10 MILLIGRAM(S): at 21:50

## 2017-10-02 RX ADMIN — HYDROMORPHONE HYDROCHLORIDE 0.5 MILLIGRAM(S): 2 INJECTION INTRAMUSCULAR; INTRAVENOUS; SUBCUTANEOUS at 15:15

## 2017-10-02 RX ADMIN — Medication 300 MILLIGRAM(S): at 19:12

## 2017-10-02 RX ADMIN — HEPARIN SODIUM 5000 UNIT(S): 5000 INJECTION INTRAVENOUS; SUBCUTANEOUS at 23:50

## 2017-10-02 RX ADMIN — Medication 10 MILLIGRAM(S): at 23:46

## 2017-10-02 RX ADMIN — HEPARIN SODIUM 5000 UNIT(S): 5000 INJECTION INTRAVENOUS; SUBCUTANEOUS at 16:42

## 2017-10-02 RX ADMIN — SODIUM CHLORIDE 125 MILLILITER(S): 9 INJECTION, SOLUTION INTRAVENOUS at 19:12

## 2017-10-02 RX ADMIN — SODIUM CHLORIDE 125 MILLILITER(S): 9 INJECTION, SOLUTION INTRAVENOUS at 14:38

## 2017-10-02 RX ADMIN — HYDROMORPHONE HYDROCHLORIDE 0.5 MILLIGRAM(S): 2 INJECTION INTRAMUSCULAR; INTRAVENOUS; SUBCUTANEOUS at 14:30

## 2017-10-02 RX ADMIN — HYDROMORPHONE HYDROCHLORIDE 0.5 MILLIGRAM(S): 2 INJECTION INTRAMUSCULAR; INTRAVENOUS; SUBCUTANEOUS at 15:00

## 2017-10-02 RX ADMIN — Medication 100 MILLIGRAM(S): at 16:42

## 2017-10-02 RX ADMIN — SODIUM CHLORIDE 2000 MILLILITER(S): 9 INJECTION INTRAMUSCULAR; INTRAVENOUS; SUBCUTANEOUS at 21:48

## 2017-10-02 RX ADMIN — HYDROMORPHONE HYDROCHLORIDE 0.5 MILLIGRAM(S): 2 INJECTION INTRAMUSCULAR; INTRAVENOUS; SUBCUTANEOUS at 14:16

## 2017-10-02 NOTE — PATIENT PROFILE ADULT. - VISION (WITH CORRECTIVE LENSES IF THE PATIENT USUALLY WEARS THEM):
Pt uses glasses/Normal vision: sees adequately in most situations; can see medication labels, newsprint

## 2017-10-02 NOTE — BRIEF OPERATIVE NOTE - PROCEDURE
<<-----Click on this checkbox to enter Procedure Prostatectomy, simple, robot-assisted, laparoscopic, using da Jie Xi  10/02/2017    Active  PSAMSON

## 2017-10-02 NOTE — PROGRESS NOTE ADULT - SUBJECTIVE AND OBJECTIVE BOX
Post op check    This 73 yo M is s/p Dominguez. SPP/umb hernia repair  PMH: MALLY; asthma; DM; chol; PVD; intracranial hemorrhage    Pt is awake and alert  Has no c/o  Afeb 116/74  90  99%RA  Abd- soft; appropriately tender             wounds C&D  South/ CBI  clear pink  KARLI    18.2/40.8/1.4  K 5.9...repeated 5.6 Post op check    This 73 yo M is s/p Dominguez. SPP/umb hernia repair  PMH: MALLY; asthma; DM; chol; PVD; intracranial hemorrhage    Pt is awake and alert  Has no c/o  Afeb 116/74  90  99%RA  Abd- soft; appropriately tender             wounds C&D  South/ CBI  clear pink  KARLI 40 cc    18.2/40.8/1.4  K 5.9...repeated 5.6

## 2017-10-03 DIAGNOSIS — G47.33 OBSTRUCTIVE SLEEP APNEA (ADULT) (PEDIATRIC): ICD-10-CM

## 2017-10-03 DIAGNOSIS — J45.20 MILD INTERMITTENT ASTHMA, UNCOMPLICATED: ICD-10-CM

## 2017-10-03 DIAGNOSIS — D72.829 ELEVATED WHITE BLOOD CELL COUNT, UNSPECIFIED: ICD-10-CM

## 2017-10-03 DIAGNOSIS — E66.01 MORBID (SEVERE) OBESITY DUE TO EXCESS CALORIES: ICD-10-CM

## 2017-10-03 DIAGNOSIS — Z29.9 ENCOUNTER FOR PROPHYLACTIC MEASURES, UNSPECIFIED: ICD-10-CM

## 2017-10-03 DIAGNOSIS — E78.5 HYPERLIPIDEMIA, UNSPECIFIED: ICD-10-CM

## 2017-10-03 DIAGNOSIS — N40.0 BENIGN PROSTATIC HYPERPLASIA WITHOUT LOWER URINARY TRACT SYMPTOMS: ICD-10-CM

## 2017-10-03 DIAGNOSIS — E11.9 TYPE 2 DIABETES MELLITUS WITHOUT COMPLICATIONS: ICD-10-CM

## 2017-10-03 LAB
BUN SERPL-MCNC: 13 MG/DL — SIGNIFICANT CHANGE UP (ref 7–23)
BUN SERPL-MCNC: 14 MG/DL — SIGNIFICANT CHANGE UP (ref 7–23)
CALCIUM SERPL-MCNC: 8 MG/DL — LOW (ref 8.4–10.5)
CALCIUM SERPL-MCNC: 8.1 MG/DL — LOW (ref 8.4–10.5)
CHLORIDE SERPL-SCNC: 98 MMOL/L — SIGNIFICANT CHANGE UP (ref 98–107)
CHLORIDE SERPL-SCNC: 99 MMOL/L — SIGNIFICANT CHANGE UP (ref 98–107)
CO2 SERPL-SCNC: 25 MMOL/L — SIGNIFICANT CHANGE UP (ref 22–31)
CO2 SERPL-SCNC: 29 MMOL/L — SIGNIFICANT CHANGE UP (ref 22–31)
CREAT SERPL-MCNC: 1.12 MG/DL — SIGNIFICANT CHANGE UP (ref 0.5–1.3)
CREAT SERPL-MCNC: 1.23 MG/DL — SIGNIFICANT CHANGE UP (ref 0.5–1.3)
GLUCOSE SERPL-MCNC: 209 MG/DL — HIGH (ref 70–99)
GLUCOSE SERPL-MCNC: 212 MG/DL — HIGH (ref 70–99)
HCT VFR BLD CALC: 38 % — LOW (ref 39–50)
HGB BLD-MCNC: 12.8 G/DL — LOW (ref 13–17)
MCHC RBC-ENTMCNC: 29.8 PG — SIGNIFICANT CHANGE UP (ref 27–34)
MCHC RBC-ENTMCNC: 33.7 % — SIGNIFICANT CHANGE UP (ref 32–36)
MCV RBC AUTO: 88.4 FL — SIGNIFICANT CHANGE UP (ref 80–100)
NRBC # FLD: 0 — SIGNIFICANT CHANGE UP
PLATELET # BLD AUTO: 227 K/UL — SIGNIFICANT CHANGE UP (ref 150–400)
PMV BLD: 9.7 FL — SIGNIFICANT CHANGE UP (ref 7–13)
POTASSIUM SERPL-MCNC: 4.2 MMOL/L — SIGNIFICANT CHANGE UP (ref 3.5–5.3)
POTASSIUM SERPL-MCNC: 4.8 MMOL/L — SIGNIFICANT CHANGE UP (ref 3.5–5.3)
POTASSIUM SERPL-SCNC: 4.2 MMOL/L — SIGNIFICANT CHANGE UP (ref 3.5–5.3)
POTASSIUM SERPL-SCNC: 4.8 MMOL/L — SIGNIFICANT CHANGE UP (ref 3.5–5.3)
RBC # BLD: 4.3 M/UL — SIGNIFICANT CHANGE UP (ref 4.2–5.8)
RBC # FLD: 13.8 % — SIGNIFICANT CHANGE UP (ref 10.3–14.5)
SODIUM SERPL-SCNC: 135 MMOL/L — SIGNIFICANT CHANGE UP (ref 135–145)
SODIUM SERPL-SCNC: 138 MMOL/L — SIGNIFICANT CHANGE UP (ref 135–145)
WBC # BLD: 12.06 K/UL — HIGH (ref 3.8–10.5)
WBC # FLD AUTO: 12.06 K/UL — HIGH (ref 3.8–10.5)

## 2017-10-03 PROCEDURE — 99223 1ST HOSP IP/OBS HIGH 75: CPT

## 2017-10-03 PROCEDURE — 93010 ELECTROCARDIOGRAM REPORT: CPT

## 2017-10-03 RX ORDER — SODIUM CHLORIDE 9 MG/ML
1000 INJECTION, SOLUTION INTRAVENOUS
Qty: 0 | Refills: 0 | Status: DISCONTINUED | OUTPATIENT
Start: 2017-10-03 | End: 2017-10-04

## 2017-10-03 RX ORDER — OXYBUTYNIN CHLORIDE 5 MG
5 TABLET ORAL EVERY 6 HOURS
Qty: 0 | Refills: 0 | Status: DISCONTINUED | OUTPATIENT
Start: 2017-10-03 | End: 2017-10-04

## 2017-10-03 RX ADMIN — Medication 5 MILLIGRAM(S): at 13:32

## 2017-10-03 RX ADMIN — Medication 10 MILLIGRAM(S): at 19:45

## 2017-10-03 RX ADMIN — Medication 2: at 22:28

## 2017-10-03 RX ADMIN — Medication 100 MILLIGRAM(S): at 13:32

## 2017-10-03 RX ADMIN — Medication 100 MILLIGRAM(S): at 06:55

## 2017-10-03 RX ADMIN — HEPARIN SODIUM 5000 UNIT(S): 5000 INJECTION INTRAVENOUS; SUBCUTANEOUS at 15:47

## 2017-10-03 RX ADMIN — ONDANSETRON 4 MILLIGRAM(S): 8 TABLET, FILM COATED ORAL at 04:55

## 2017-10-03 RX ADMIN — Medication 4: at 12:36

## 2017-10-03 RX ADMIN — Medication 5 MILLIGRAM(S): at 10:38

## 2017-10-03 RX ADMIN — Medication 2: at 08:35

## 2017-10-03 RX ADMIN — SODIUM CHLORIDE 75 MILLILITER(S): 9 INJECTION, SOLUTION INTRAVENOUS at 08:44

## 2017-10-03 RX ADMIN — Medication 300 MILLIGRAM(S): at 08:36

## 2017-10-03 RX ADMIN — HEPARIN SODIUM 5000 UNIT(S): 5000 INJECTION INTRAVENOUS; SUBCUTANEOUS at 06:55

## 2017-10-03 RX ADMIN — Medication 10 MILLIGRAM(S): at 08:56

## 2017-10-03 RX ADMIN — Medication 2: at 17:52

## 2017-10-03 RX ADMIN — SENNA PLUS 2 TABLET(S): 8.6 TABLET ORAL at 21:26

## 2017-10-03 RX ADMIN — Medication 1 TABLET(S): at 17:52

## 2017-10-03 RX ADMIN — Medication 100 MILLIGRAM(S): at 21:26

## 2017-10-03 NOTE — CONSULT NOTE ADULT - SUBJECTIVE AND OBJECTIVE BOX
CC: "I'm here for bladder surgery"    HPI:  This is a 72 yr old man with a hx of HLD, morbid obesity, type 2 DM, asthma, MALLY on CPAP, and BPH here for robotic suprapubic prostatectomy currently POD #1. Pt s/p left inguinal hernia repair in 6/2017, reports urinary retention after surgery requiring ba catheter placement. Pt had since then had the ba catheter in place and was changed at least 5 times. Currently, patient endorses lower abdominal pain, described as sharp, 8/10 in intensity at its worst, non-radiating, localized to the lower belly, better with analgesics, and worse with movement. He also endorses associated nausea, which is improved with anti-emetics. He denies hematuria, dysuria, flank pain, or fevers.    PAST MEDICAL & SURGICAL HISTORY:  PVD (peripheral vascular disease)  Umbilical hernia  Intracranial hemorrhage: h/o 20 yrs ago  Asthma: h/o childhood  Obstructive sleep apnea: on CPAP at 13  Inguinal hernia: Left  Dyslipidemia  BPH (benign prostatic hypertrophy)  Diabetes mellitus: type II  Obesity  H/O left inguinal hernia repair: 6/2017  H/O eye surgery: removal of right eye pigmentation &gt;35 years ago  H/O right inguinal hernia repair: 8/2015  Encounter for screening colonoscopy    Review of Systems:   CONSTITUTIONAL: No fever, weight loss, or fatigue  EYES: No eye pain, visual disturbances, or discharge  ENMT:  No difficulty hearing, tinnitus, vertigo; No sinus or throat pain  NECK: No pain or stiffness  RESPIRATORY: No cough, wheezing, chills or hemoptysis; No shortness of breath  CARDIOVASCULAR: No chest pain, palpitations, dizziness, or leg swelling  GASTROINTESTINAL: Abdominal pain+, Nausea +, No vomiting, or hematemesis; No diarrhea or constipation. No melena or hematochezia.  GENITOURINARY: No dysuria, frequency, hematuria, or incontinence  NEUROLOGICAL: No headaches, memory loss, loss of strength, numbness, or tremors  SKIN: No itching, burning, rashes, or lesions   LYMPH NODES: No enlarged glands  ENDOCRINE: No heat or cold intolerance; No hair loss  MUSCULOSKELETAL: No joint pain or swelling; No muscle, back, or extremity pain  PSYCHIATRIC: No depression, anxiety, mood swings, or difficulty sleeping    Allergies    penicillin (Unknown)    Intolerances    Social History: Non-smoker, non-drinker, lives in community     FAMILY HISTORY:  Family history of coronary artery disease (Father)  Family history of diabetes mellitus (Father)  Family history of lung cancer (Mother)    MEDICATIONS  (STANDING):  dextrose 5% + sodium chloride 0.45%. 1000 milliLiter(s) (75 mL/Hr) IV Continuous <Continuous>  dextrose 5%. 1000 milliLiter(s) (50 mL/Hr) IV Continuous <Continuous>  dextrose 50% Injectable 12.5 Gram(s) IV Push once  docusate sodium 100 milliGRAM(s) Oral three times a day  heparin  Injectable 5000 Unit(s) SubCutaneous every 8 hours  influenza   Vaccine 0.5 milliLiter(s) IntraMuscular once  insulin lispro (HumaLOG) corrective regimen sliding scale   SubCutaneous three times a day before meals  insulin lispro (HumaLOG) corrective regimen sliding scale   SubCutaneous at bedtime  oxybutynin 5 milliGRAM(s) Oral every 6 hours  senna 2 Tablet(s) Oral at bedtime  trimethoprim  160 mG/sulfamethoxazole 800 mG 1 Tablet(s) Oral every 12 hours    MEDICATIONS  (PRN):  acetaminophen   Tablet. 650 milliGRAM(s) Oral every 6 hours PRN Mild Pain (1 - 3)  dextrose Gel 1 Dose(s) Oral once PRN Blood Glucose LESS THAN 70 milliGRAM(s)/deciliter  fluticasone propionate 50 MICROgram(s)/spray Nasal Spray 1 Spray(s) Both Nostrils daily PRN wheezing  glucagon  Injectable 1 milliGRAM(s) IntraMuscular once PRN Glucose LESS THAN 70 milligrams/deciliter  metoclopramide Injectable 10 milliGRAM(s) IV Push every 6 hours PRN Nausea and/or Vomiting  morphine  - Injectable 4 milliGRAM(s) IV Push every 4 hours PRN Severe Pain (7 - 10)  ondansetron Injectable 4 milliGRAM(s) IV Push every 8 hours PRN Nausea and/or Vomiting  oxyCODONE    5 mG/acetaminophen 325 mG 1 Tablet(s) Oral every 4 hours PRN Moderate Pain        PHYSICAL EXAM:  Vital Signs Last 24 Hrs  T(C): 36.8 (03 Oct 2017 14:17), Max: 37.7 (03 Oct 2017 01:27)  T(F): 98.3 (03 Oct 2017 14:17), Max: 99.8 (03 Oct 2017 01:27)  HR: 106 (03 Oct 2017 14:17) (90 - 107)  BP: 134/74 (03 Oct 2017 14:17) (116/74 - 151/85)  BP(mean): --  RR: 16 (03 Oct 2017 14:17) (12 - 18)  SpO2: 97% (03 Oct 2017 14:17) (67% - 99%)    I&O's Summary    02 Oct 2017 07:01  -  03 Oct 2017 07:00  --------------------------------------------------------  IN: 645 mL / OUT: 135 mL / NET: 510 mL    03 Oct 2017 07:01  -  03 Oct 2017 15:42  --------------------------------------------------------  IN: 300 mL / OUT: 7.5 mL / NET: 292.5 mL    GENERAL: NAD, well-developed  HEAD:  Atraumatic, Normocephalic  EYES: EOMI, PERRLA, conjunctiva and sclera clear  NECK: Supple, No JVD  CHEST/LUNG: Clear to auscultation bilaterally; No wheeze  HEART: Regular rate and rhythm; No murmurs, rubs, or gallops  ABDOMEN: Obese, slightly distended, mild tenderness to deep palpation, Bowel sounds present  : KARLI drain with small amount of serosanguinous fluid   EXTREMITIES:  2+ Peripheral Pulses, No clubbing, cyanosis, or edema  PSYCH: AAOx3  NEUROLOGY: non-focal  SKIN: Wound C/D/I    LABS:  CAPILLARY BLOOD GLUCOSE  215 (03 Oct 2017 12:06)  175 (03 Oct 2017 08:00)  174 (02 Oct 2017 23:08)  205 (02 Oct 2017 16:00)                        12.8   12.06 )-----------( 227      ( 03 Oct 2017 06:00 )             38.0     10-03    135  |  98  |  13  ----------------------------<  209<H>  4.2   |  25  |  1.12    Ca    8.1<L>      03 Oct 2017 06:00    RADIOLOGY & ADDITIONAL TESTS:    Imaging Personally Reviewed:  EKG- Sinus Rhythm with 1st degree AV block     Consultant(s) Notes Reviewed:      Care Discussed with Consultants/Other Providers: Urology- Discussed resuming metformin and Simvastatin on discharge

## 2017-10-03 NOTE — CONSULT NOTE ADULT - PROBLEM SELECTOR RECOMMENDATION 7
DVT ppx- Hep SC BMI 38.7 but complicated by type 2 DM  - Resume obesity meds once discharged once cleared by PCP

## 2017-10-03 NOTE — CONSULT NOTE ADULT - ASSESSMENT
72 yr old man with a hx of HLD, morbid obesity, type 2 DM, asthma, MALLY on CPAP, and BPH here for robotic suprapubic prostatectomy currently POD #1.

## 2017-10-03 NOTE — CONSULT NOTE ADULT - PROBLEM SELECTOR RECOMMENDATION 6
BMI 38.7 but complicated by type 2 DM  - Resume obesity meds once discharged once cleared by PCP on CPAP; will bring in home CPAP machine

## 2017-10-03 NOTE — PROGRESS NOTE ADULT - SUBJECTIVE AND OBJECTIVE BOX
POD #1    Afeb 150/85 96 98%RA    Pt has c/o nausea; no vomiting    Abd- soft NT ND; no flatus    wounds C&D    South/ CBI clear pink  K 4.8

## 2017-10-03 NOTE — CONSULT NOTE ADULT - PROBLEM SELECTOR RECOMMENDATION 2
Stable; no symptoms at this time  -Not on any home meds Likely reactive; low suspicion for infectious etiology at this time  - Trend WBC count daily  - If fever or hemodynamic instability, check BCx

## 2017-10-03 NOTE — CONSULT NOTE ADULT - PROBLEM SELECTOR RECOMMENDATION 4
- Recommend re-starting simvastatin on discharge Last A1C 6.5  - Hold oral hypoglycemic agents while hospitalized  - Monitor FS's on SS; currently borderline high   - If remain high will consider basal/bolus insulin tomorrow

## 2017-10-03 NOTE — CONSULT NOTE ADULT - PROBLEM SELECTOR PROBLEM 3
Type 2 diabetes mellitus without complication, without long-term current use of insulin Mild intermittent asthma without complication

## 2017-10-03 NOTE — CONSULT NOTE ADULT - PROBLEM SELECTOR RECOMMENDATION 3
Last A1C 6.5  - Hold oral hypoglycemic agents while hospitalized  - Monitor FS's on SS; currently borderline high   - If remain high will consider basal/bolus insulin tomorrow Stable; no symptoms at this time  -Not on any home meds

## 2017-10-03 NOTE — CONSULT NOTE ADULT - PROBLEM SELECTOR PROBLEM 4
Dyslipidemia Type 2 diabetes mellitus without complication, without long-term current use of insulin

## 2017-10-03 NOTE — CONSULT NOTE ADULT - PROBLEM SELECTOR RECOMMENDATION 9
- Postoperative care per primary team  - Encourage early ambulation  - Incentive spirometry  - DVT ppx  - Advance diet as tolerated  - Pain control  - C/w oxybutnin   - IVF

## 2017-10-04 DIAGNOSIS — D62 ACUTE POSTHEMORRHAGIC ANEMIA: ICD-10-CM

## 2017-10-04 LAB
BUN SERPL-MCNC: 12 MG/DL — SIGNIFICANT CHANGE UP (ref 7–23)
CALCIUM SERPL-MCNC: 8.3 MG/DL — LOW (ref 8.4–10.5)
CHLORIDE SERPL-SCNC: 96 MMOL/L — LOW (ref 98–107)
CO2 SERPL-SCNC: 29 MMOL/L — SIGNIFICANT CHANGE UP (ref 22–31)
CREAT FLD-MCNC: 0.98 MG/DL — SIGNIFICANT CHANGE UP
CREAT SERPL-MCNC: 1.06 MG/DL — SIGNIFICANT CHANGE UP (ref 0.5–1.3)
GLUCOSE SERPL-MCNC: 207 MG/DL — HIGH (ref 70–99)
HCT VFR BLD CALC: 37.1 % — LOW (ref 39–50)
HGB BLD-MCNC: 12.3 G/DL — LOW (ref 13–17)
MCHC RBC-ENTMCNC: 30.4 PG — SIGNIFICANT CHANGE UP (ref 27–34)
MCHC RBC-ENTMCNC: 33.2 % — SIGNIFICANT CHANGE UP (ref 32–36)
MCV RBC AUTO: 91.8 FL — SIGNIFICANT CHANGE UP (ref 80–100)
NRBC # FLD: 0 — SIGNIFICANT CHANGE UP
PLATELET # BLD AUTO: 218 K/UL — SIGNIFICANT CHANGE UP (ref 150–400)
PMV BLD: 10 FL — SIGNIFICANT CHANGE UP (ref 7–13)
POTASSIUM SERPL-MCNC: 4.3 MMOL/L — SIGNIFICANT CHANGE UP (ref 3.5–5.3)
POTASSIUM SERPL-SCNC: 4.3 MMOL/L — SIGNIFICANT CHANGE UP (ref 3.5–5.3)
RBC # BLD: 4.04 M/UL — LOW (ref 4.2–5.8)
RBC # FLD: 13.7 % — SIGNIFICANT CHANGE UP (ref 10.3–14.5)
SODIUM SERPL-SCNC: 138 MMOL/L — SIGNIFICANT CHANGE UP (ref 135–145)
WBC # BLD: 16.03 K/UL — HIGH (ref 3.8–10.5)
WBC # FLD AUTO: 16.03 K/UL — HIGH (ref 3.8–10.5)

## 2017-10-04 PROCEDURE — 99233 SBSQ HOSP IP/OBS HIGH 50: CPT

## 2017-10-04 RX ORDER — HYDROMORPHONE HYDROCHLORIDE 2 MG/ML
1 INJECTION INTRAMUSCULAR; INTRAVENOUS; SUBCUTANEOUS EVERY 4 HOURS
Qty: 0 | Refills: 0 | Status: DISCONTINUED | OUTPATIENT
Start: 2017-10-04 | End: 2017-10-07

## 2017-10-04 RX ORDER — HYDROMORPHONE HYDROCHLORIDE 2 MG/ML
0.5 INJECTION INTRAMUSCULAR; INTRAVENOUS; SUBCUTANEOUS EVERY 4 HOURS
Qty: 0 | Refills: 0 | Status: DISCONTINUED | OUTPATIENT
Start: 2017-10-04 | End: 2017-10-07

## 2017-10-04 RX ORDER — SODIUM CHLORIDE 9 MG/ML
1000 INJECTION, SOLUTION INTRAVENOUS
Qty: 0 | Refills: 0 | Status: DISCONTINUED | OUTPATIENT
Start: 2017-10-04 | End: 2017-10-07

## 2017-10-04 RX ADMIN — HEPARIN SODIUM 5000 UNIT(S): 5000 INJECTION INTRAVENOUS; SUBCUTANEOUS at 00:28

## 2017-10-04 RX ADMIN — SODIUM CHLORIDE 100 MILLILITER(S): 9 INJECTION, SOLUTION INTRAVENOUS at 13:01

## 2017-10-04 RX ADMIN — Medication 5 MILLIGRAM(S): at 05:30

## 2017-10-04 RX ADMIN — Medication 1 TABLET(S): at 05:30

## 2017-10-04 RX ADMIN — Medication 100 MILLIGRAM(S): at 05:30

## 2017-10-04 RX ADMIN — ONDANSETRON 4 MILLIGRAM(S): 8 TABLET, FILM COATED ORAL at 03:15

## 2017-10-04 RX ADMIN — Medication 2: at 09:38

## 2017-10-04 RX ADMIN — HEPARIN SODIUM 5000 UNIT(S): 5000 INJECTION INTRAVENOUS; SUBCUTANEOUS at 07:03

## 2017-10-04 RX ADMIN — HEPARIN SODIUM 5000 UNIT(S): 5000 INJECTION INTRAVENOUS; SUBCUTANEOUS at 14:19

## 2017-10-04 RX ADMIN — Medication 351.25 MILLIGRAM(S): at 13:11

## 2017-10-04 RX ADMIN — Medication 2: at 13:01

## 2017-10-04 RX ADMIN — Medication 351.25 MILLIGRAM(S): at 22:53

## 2017-10-04 RX ADMIN — Medication 10 MILLIGRAM(S): at 08:19

## 2017-10-04 RX ADMIN — Medication 5 MILLIGRAM(S): at 00:28

## 2017-10-04 NOTE — PROGRESS NOTE ADULT - SUBJECTIVE AND OBJECTIVE BOX
POD #2    Afeb 123/55  106  100%RA    Pt has c/o nausea; does not feel well    Abd- distended; blisters on skin where tape was; no flatus         staples intact  South/ CBI clear

## 2017-10-04 NOTE — PROGRESS NOTE ADULT - PROBLEM SELECTOR PLAN 3
Stable; no symptoms at this time  -Not on any home meds Mild drop from 15 > 12.3 recently  - Trend CBC  - Transfuse for Hgb < 7

## 2017-10-04 NOTE — PROGRESS NOTE ADULT - SUBJECTIVE AND OBJECTIVE BOX
CC: F/u for Benign Prostatic Hyperplasia     SUBJECTIVE / OVERNIGHT EVENTS: Patient seen and examined. No acute overnight events. No complaints in AM. Still not passing gas. Denies any fevers, chills, nausea, and vomiting. Otherwise feels well at this time.     MEDICATIONS  (STANDING):  dextrose 5%. 1000 milliLiter(s) (50 mL/Hr) IV Continuous <Continuous>  dextrose 50% Injectable 12.5 Gram(s) IV Push once  heparin  Injectable 5000 Unit(s) SubCutaneous every 8 hours  influenza   Vaccine 0.5 milliLiter(s) IntraMuscular once  insulin lispro (HumaLOG) corrective regimen sliding scale   SubCutaneous three times a day before meals  insulin lispro (HumaLOG) corrective regimen sliding scale   SubCutaneous at bedtime  sodium chloride 0.45%. 1000 milliLiter(s) (100 mL/Hr) IV Continuous <Continuous>  trimethoprim / sulfamethoxazole IVPB 430 milliGRAM(s) IV Intermittent every 8 hours    MEDICATIONS  (PRN):  dextrose Gel 1 Dose(s) Oral once PRN Blood Glucose LESS THAN 70 milliGRAM(s)/deciliter  fluticasone propionate 50 MICROgram(s)/spray Nasal Spray 1 Spray(s) Both Nostrils daily PRN wheezing  glucagon  Injectable 1 milliGRAM(s) IntraMuscular once PRN Glucose LESS THAN 70 milligrams/deciliter  HYDROmorphone  Injectable 1 milliGRAM(s) IV Push every 4 hours PRN Severe Pain (7 - 10)  HYDROmorphone  Injectable 0.5 milliGRAM(s) IV Push every 4 hours PRN mild-mod pain  metoclopramide Injectable 10 milliGRAM(s) IV Push every 6 hours PRN Nausea and/or Vomiting  ondansetron Injectable 4 milliGRAM(s) IV Push every 8 hours PRN Nausea and/or Vomiting    PHYSICAL EXAM:  VITALS:  T(F): 98.8 (10-04-17 @ 15:03), Max: 99.1 (10-04-17 @ 05:28)  HR: 109 (10-04-17 @ 15:03) (88 - 119)  BP: 113/68 (10-04-17 @ 15:03) (113/68 - 141/70)  RR: 18 (10-04-17 @ 15:03) (16 - 18)  SpO2: 96% (10-04-17 @ 15:03)    PHYSICAL EXAM:  GENERAL: NAD, well-developed  HEAD:  Atraumatic, Normocephalic  EYES: EOMI, PERRLA, conjunctiva and sclera clear  NECK: Supple, No JVD  CHEST/LUNG: Clear to auscultation bilaterally; No wheeze  HEART: Regular rate and rhythm; No murmurs, rubs, or gallops  ABDOMEN: Obese, slightly distended, mild tenderness to deep palpation, Bowel sounds present  : KARLI drain with small amount of serosanguinous fluid   EXTREMITIES:  2+ Peripheral Pulses, No clubbing, cyanosis, or edema  PSYCH: AAOx3  NEUROLOGY: non-focal  SKIN: Wound C/D/I    LABS:  CAPILLARY BLOOD GLUCOSE  169 (04 Oct 2017 12:09)  195 (04 Oct 2017 09:18)  262 (03 Oct 2017 22:17)  155 (03 Oct 2017 17:50)               12.3   16.03 )-----------( 218      ( 04 Oct 2017 06:34 )             37.1   10-04    138  |  96<L>  |  12  ----------------------------<  207<H>  4.3   |  29  |  1.06    Ca    8.3<L>      04 Oct 2017 06:34      RADIOLOGY & ADDITIONAL TESTS:  EKG Personally Reviewed- Sinus Rhythm with 1st degree AV block    [ ] Consultant(s) Notes Reviewed:  [x] Care Discussed with Consultants/Other Providers: Urology- Discussed switching D5 to NS fluids

## 2017-10-04 NOTE — PROGRESS NOTE ADULT - PROBLEM SELECTOR PLAN 5
- Recommend re-starting simvastatin on discharge. Last A1C 6.5  - Hold oral hypoglycemic agents while hospitalized  - Monitor FS's on SS; currently borderline high   - Recommend switching maintenance fluids from D5 to NS at current rate

## 2017-10-04 NOTE — PROGRESS NOTE ADULT - PROBLEM SELECTOR PLAN 7
BMI 38.7 but complicated by type 2 DM  - Resume obesity meds once discharged once cleared by PCP. on CPAP; will bring in home CPAP

## 2017-10-04 NOTE — PROGRESS NOTE ADULT - PROBLEM SELECTOR PLAN 8
DVT ppx- Hep SC. BMI 38.7 but complicated by type 2 DM  - Resume obesity meds once discharged once cleared by PCP.

## 2017-10-04 NOTE — PROGRESS NOTE ADULT - ASSESSMENT
72 yr old man with a hx of HLD, morbid obesity, type 2 DM, asthma, MALLY on CPAP, and BPH here for robotic suprapubic prostatectomy currently POD #2.

## 2017-10-04 NOTE — PROGRESS NOTE ADULT - PROBLEM SELECTOR PLAN 4
Last A1C 6.5  - Hold oral hypoglycemic agents while hospitalized  - Monitor FS's on SS; currently borderline high   - Recommend switching maintenance fluids from D5 to NS at current rate Stable; no symptoms at this time  -Not on any home meds

## 2017-10-05 LAB
BUN SERPL-MCNC: 13 MG/DL — SIGNIFICANT CHANGE UP (ref 7–23)
CALCIUM SERPL-MCNC: 8 MG/DL — LOW (ref 8.4–10.5)
CHLORIDE SERPL-SCNC: 101 MMOL/L — SIGNIFICANT CHANGE UP (ref 98–107)
CO2 SERPL-SCNC: 25 MMOL/L — SIGNIFICANT CHANGE UP (ref 22–31)
CREAT SERPL-MCNC: 1.11 MG/DL — SIGNIFICANT CHANGE UP (ref 0.5–1.3)
GLUCOSE SERPL-MCNC: 145 MG/DL — HIGH (ref 70–99)
HCT VFR BLD CALC: 34.1 % — LOW (ref 39–50)
HGB BLD-MCNC: 11.2 G/DL — LOW (ref 13–17)
MCHC RBC-ENTMCNC: 30 PG — SIGNIFICANT CHANGE UP (ref 27–34)
MCHC RBC-ENTMCNC: 32.8 % — SIGNIFICANT CHANGE UP (ref 32–36)
MCV RBC AUTO: 91.4 FL — SIGNIFICANT CHANGE UP (ref 80–100)
NRBC # FLD: 0 — SIGNIFICANT CHANGE UP
PLATELET # BLD AUTO: 211 K/UL — SIGNIFICANT CHANGE UP (ref 150–400)
PMV BLD: 10 FL — SIGNIFICANT CHANGE UP (ref 7–13)
POTASSIUM SERPL-MCNC: 3.9 MMOL/L — SIGNIFICANT CHANGE UP (ref 3.5–5.3)
POTASSIUM SERPL-SCNC: 3.9 MMOL/L — SIGNIFICANT CHANGE UP (ref 3.5–5.3)
RBC # BLD: 3.73 M/UL — LOW (ref 4.2–5.8)
RBC # FLD: 13.6 % — SIGNIFICANT CHANGE UP (ref 10.3–14.5)
SODIUM SERPL-SCNC: 140 MMOL/L — SIGNIFICANT CHANGE UP (ref 135–145)
WBC # BLD: 12.14 K/UL — HIGH (ref 3.8–10.5)
WBC # FLD AUTO: 12.14 K/UL — HIGH (ref 3.8–10.5)

## 2017-10-05 PROCEDURE — 99233 SBSQ HOSP IP/OBS HIGH 50: CPT

## 2017-10-05 RX ORDER — BACITRACIN ZINC 500 UNIT/G
1 OINTMENT IN PACKET (EA) TOPICAL
Qty: 0 | Refills: 0 | Status: DISCONTINUED | OUTPATIENT
Start: 2017-10-05 | End: 2017-10-08

## 2017-10-05 RX ORDER — OXYBUTYNIN CHLORIDE 5 MG
5 TABLET ORAL EVERY 6 HOURS
Qty: 0 | Refills: 0 | Status: DISCONTINUED | OUTPATIENT
Start: 2017-10-05 | End: 2017-10-08

## 2017-10-05 RX ORDER — OXYBUTYNIN CHLORIDE 5 MG
5 TABLET ORAL ONCE
Qty: 0 | Refills: 0 | Status: COMPLETED | OUTPATIENT
Start: 2017-10-05 | End: 2017-10-05

## 2017-10-05 RX ADMIN — Medication 351.25 MILLIGRAM(S): at 23:27

## 2017-10-05 RX ADMIN — Medication 5 MILLIGRAM(S): at 17:04

## 2017-10-05 RX ADMIN — Medication 1 APPLICATION(S): at 18:00

## 2017-10-05 RX ADMIN — Medication 2: at 08:48

## 2017-10-05 RX ADMIN — Medication 5 MILLIGRAM(S): at 23:28

## 2017-10-05 RX ADMIN — Medication 351.25 MILLIGRAM(S): at 06:14

## 2017-10-05 RX ADMIN — HEPARIN SODIUM 5000 UNIT(S): 5000 INJECTION INTRAVENOUS; SUBCUTANEOUS at 08:46

## 2017-10-05 RX ADMIN — HEPARIN SODIUM 5000 UNIT(S): 5000 INJECTION INTRAVENOUS; SUBCUTANEOUS at 00:21

## 2017-10-05 RX ADMIN — HEPARIN SODIUM 5000 UNIT(S): 5000 INJECTION INTRAVENOUS; SUBCUTANEOUS at 17:04

## 2017-10-05 RX ADMIN — SODIUM CHLORIDE 100 MILLILITER(S): 9 INJECTION, SOLUTION INTRAVENOUS at 23:34

## 2017-10-05 RX ADMIN — Medication 351.25 MILLIGRAM(S): at 13:59

## 2017-10-05 NOTE — PROGRESS NOTE ADULT - SUBJECTIVE AND OBJECTIVE BOX
Overnight events:  None, urine remained clear off CBI    Subjective:  Pt feels better, no further episodes of nausea, pain controlled, + flatus    Objective:    Vital signs  T(C): , Max: 37.1 (10-04-17 @ 10:23)  HR: 90 (10-05-17 @ 05:52)  BP: 111/68 (10-05-17 @ 05:52)  SpO2: 93% (10-05-17 @ 05:52)  Wt(kg): --    Output   Ba: 1750 yellow  KARLI: 5      Gen: NAD  Abd: staples C/D/I, decreased distention per team, soft, appropriately tender around umbilical incision  : ba secured    Labs                        11.2   12.14 )-----------( 211      ( 05 Oct 2017 06:30 )             34.1   10-05    140  |  101  |  13  ----------------------------<  145<H>  3.9   |  25  |  1.11    Ca    8.0<L>      05 Oct 2017 06:30 Overnight events:  None, urine remained clear off CBI    Subjective:  Pt feels better, no further episodes of nausea, pain controlled, + flatus    Objective:    Vital signs  T(C): , Max: 37.1 (10-04-17 @ 10:23)  HR: 90 (10-05-17 @ 05:52)  BP: 111/68 (10-05-17 @ 05:52)  SpO2: 93% (10-05-17 @ 05:52)  Wt(kg): --    Output   South: 1750 yellow  KARLI: 5      Gen: NAD  Abd: staples C/D/I, decreased distention per team, soft, appropriately tender around umbilical incision, healing tape blisters without drainage or infection  : mejia secured    Labs                        11.2   12.14 )-----------( 211      ( 05 Oct 2017 06:30 )             34.1   10-05    140  |  101  |  13  ----------------------------<  145<H>  3.9   |  25  |  1.11    Ca    8.0<L>      05 Oct 2017 06:30

## 2017-10-05 NOTE — PROGRESS NOTE ADULT - PROBLEM SELECTOR PLAN 5
Last A1C 6.5  - Hold oral hypoglycemic agents while hospitalized  - Monitor FS's on SS; currently borderline high   - Recommend switching maintenance fluids from D5 to NS at current rate

## 2017-10-05 NOTE — PROGRESS NOTE ADULT - ASSESSMENT
71 yo M POD #3 robotic suprapubic prostatectomy, umbilical hernia repair, had some postop nausea, now resolved.

## 2017-10-05 NOTE — PROGRESS NOTE ADULT - ASSESSMENT
72 yr old man with a hx of HLD, morbid obesity, type 2 DM, asthma, MALLY on CPAP, and BPH here for robotic suprapubic prostatectomy currently POD #3.

## 2017-10-05 NOTE — PROGRESS NOTE ADULT - SUBJECTIVE AND OBJECTIVE BOX
CC: F/u for Benign Prostatic Hyperplasia     SUBJECTIVE / OVERNIGHT EVENTS: Patient seen and examined. No acute overnight events. No complaints in AM. Denies any fevers, chills, nausea, and vomiting. Otherwise feels well at this time.     MEDICATIONS  (STANDING):  dextrose 5%. 1000 milliLiter(s) (50 mL/Hr) IV Continuous <Continuous>  dextrose 50% Injectable 12.5 Gram(s) IV Push once  heparin  Injectable 5000 Unit(s) SubCutaneous every 8 hours  influenza   Vaccine 0.5 milliLiter(s) IntraMuscular once  insulin lispro (HumaLOG) corrective regimen sliding scale   SubCutaneous three times a day before meals  insulin lispro (HumaLOG) corrective regimen sliding scale   SubCutaneous at bedtime  sodium chloride 0.45%. 1000 milliLiter(s) (100 mL/Hr) IV Continuous <Continuous>  trimethoprim / sulfamethoxazole IVPB 430 milliGRAM(s) IV Intermittent every 8 hours    MEDICATIONS  (PRN):  dextrose Gel 1 Dose(s) Oral once PRN Blood Glucose LESS THAN 70 milliGRAM(s)/deciliter  fluticasone propionate 50 MICROgram(s)/spray Nasal Spray 1 Spray(s) Both Nostrils daily PRN wheezing  glucagon  Injectable 1 milliGRAM(s) IntraMuscular once PRN Glucose LESS THAN 70 milligrams/deciliter  HYDROmorphone  Injectable 1 milliGRAM(s) IV Push every 4 hours PRN Severe Pain (7 - 10)  HYDROmorphone  Injectable 0.5 milliGRAM(s) IV Push every 4 hours PRN mild-mod pain  metoclopramide Injectable 10 milliGRAM(s) IV Push every 6 hours PRN Nausea and/or Vomiting  ondansetron Injectable 4 milliGRAM(s) IV Push every 8 hours PRN Nausea and/or Vomiting    PHYSICAL EXAM:  Vital Signs Last 24 Hrs  T(C): 36.8 (05 Oct 2017 09:31), Max: 37.1 (04 Oct 2017 15:03)  T(F): 98.2 (05 Oct 2017 09:31), Max: 98.8 (04 Oct 2017 15:03)  HR: 100 (05 Oct 2017 09:31) (90 - 109)  BP: 105/60 (05 Oct 2017 09:31) (99/57 - 126/64)  BP(mean): --  RR: 18 (05 Oct 2017 09:31) (16 - 18)  SpO2: 96% (05 Oct 2017 09:31) (93% - 97%)    PHYSICAL EXAM:  GENERAL: NAD, well-developed  HEAD:  Atraumatic, Normocephalic  EYES: EOMI, PERRLA, conjunctiva and sclera clear  NECK: Supple, No JVD  CHEST/LUNG: Clear to auscultation bilaterally; No wheeze  HEART: Regular rate and rhythm; No murmurs, rubs, or gallops  ABDOMEN: Obese, slightly distended, mild tenderness to deep palpation, Bowel sounds present  : KARLI drain with small amount of serosanguinous fluid   EXTREMITIES:  2+ Peripheral Pulses, No clubbing, cyanosis, or edema  PSYCH: AAOx3  NEUROLOGY: non-focal  SKIN: Wound C/D/I    LABS:  CAPILLARY BLOOD GLUCOSE  129 (05 Oct 2017 12:27)  163 (05 Oct 2017 08:23)  135 (04 Oct 2017 22:13)  126 (04 Oct 2017 17:31)                          11.2   12.14 )-----------( 211      ( 05 Oct 2017 06:30 )             34.1   10-05    140  |  101  |  13  ----------------------------<  145<H>  3.9   |  25  |  1.11    Ca    8.0<L>      05 Oct 2017 06:30      RADIOLOGY & ADDITIONAL TESTS:  EKG Personally Reviewed- Sinus Rhythm with 1st degree AV block    [ ] Consultant(s) Notes Reviewed:  [x] Care Discussed with Consultants/Other Providers: Urology- Discussed continuing current regiment

## 2017-10-05 NOTE — PROGRESS NOTE ADULT - PROBLEM SELECTOR PLAN 1
Consider advance to [facility], will d/w Dr. Castillo  Monitor GI function  AM labs reviewed  OOB, ambulate  DVT prophy, IS Consider advance to [facility], will d/w Dr. Castillo  Monitor GI function  AM labs reviewed  Bacitracin to blisters  OOB, ambulate  DVT prophy, IS

## 2017-10-06 LAB
BUN SERPL-MCNC: 12 MG/DL — SIGNIFICANT CHANGE UP (ref 7–23)
CALCIUM SERPL-MCNC: 8.3 MG/DL — LOW (ref 8.4–10.5)
CHLORIDE SERPL-SCNC: 101 MMOL/L — SIGNIFICANT CHANGE UP (ref 98–107)
CO2 SERPL-SCNC: 26 MMOL/L — SIGNIFICANT CHANGE UP (ref 22–31)
CREAT SERPL-MCNC: 1.12 MG/DL — SIGNIFICANT CHANGE UP (ref 0.5–1.3)
GLUCOSE SERPL-MCNC: 132 MG/DL — HIGH (ref 70–99)
MAGNESIUM SERPL-MCNC: 2.2 MG/DL — SIGNIFICANT CHANGE UP (ref 1.6–2.6)
PHOSPHATE SERPL-MCNC: 3.9 MG/DL — SIGNIFICANT CHANGE UP (ref 2.5–4.5)
POTASSIUM SERPL-MCNC: 4.5 MMOL/L — SIGNIFICANT CHANGE UP (ref 3.5–5.3)
POTASSIUM SERPL-SCNC: 4.5 MMOL/L — SIGNIFICANT CHANGE UP (ref 3.5–5.3)
SODIUM SERPL-SCNC: 138 MMOL/L — SIGNIFICANT CHANGE UP (ref 135–145)

## 2017-10-06 PROCEDURE — 99233 SBSQ HOSP IP/OBS HIGH 50: CPT

## 2017-10-06 RX ADMIN — Medication 2: at 18:31

## 2017-10-06 RX ADMIN — HEPARIN SODIUM 5000 UNIT(S): 5000 INJECTION INTRAVENOUS; SUBCUTANEOUS at 00:37

## 2017-10-06 RX ADMIN — Medication 5 MILLIGRAM(S): at 06:41

## 2017-10-06 RX ADMIN — Medication 351.25 MILLIGRAM(S): at 06:41

## 2017-10-06 RX ADMIN — Medication 1 APPLICATION(S): at 06:42

## 2017-10-06 RX ADMIN — Medication 351.25 MILLIGRAM(S): at 15:50

## 2017-10-06 RX ADMIN — HEPARIN SODIUM 5000 UNIT(S): 5000 INJECTION INTRAVENOUS; SUBCUTANEOUS at 08:15

## 2017-10-06 RX ADMIN — Medication 1 APPLICATION(S): at 18:31

## 2017-10-06 RX ADMIN — Medication 5 MILLIGRAM(S): at 18:32

## 2017-10-06 RX ADMIN — Medication 5 MILLIGRAM(S): at 14:14

## 2017-10-06 RX ADMIN — HEPARIN SODIUM 5000 UNIT(S): 5000 INJECTION INTRAVENOUS; SUBCUTANEOUS at 15:50

## 2017-10-06 RX ADMIN — Medication 351.25 MILLIGRAM(S): at 23:40

## 2017-10-06 NOTE — PROGRESS NOTE ADULT - SUBJECTIVE AND OBJECTIVE BOX
Overnight events:  None    Subjective:  Pt feels well, + flatus, small BM, tolerating [facility], no N/V, ambulating well.    Objective:    Vital signs  T(C): , Max: 37.2 (10-05-17 @ 23:29)  HR: 84 (10-06-17 @ 06:29)  BP: 123/70 (10-06-17 @ 06:29)  SpO2: 97% (10-06-17 @ 06:29)  Wt(kg): --    Output   South: 1400  KARLI: 12.5      Gen: NAD  Abd: Staples C/D/I, resolving ecchymoses around umbilical incision, no drainage or cellulitis, softly distended, minimally tender at umbilical incision, otherwise nontender, tape blisters healing without infection  : mejia secured    Labs   06 Oct 2017 06:18    138    |  101    |  12     ----------------------------<  132    4.5     |  26     |  1.12     Ca    8.3        06 Oct 2017 06:18  Phos  3.9       06 Oct 2017 06:18  Mg     2.2       06 Oct 2017 06:18

## 2017-10-06 NOTE — PROGRESS NOTE ADULT - PROBLEM SELECTOR PLAN 1
Consider advance to [facility], will d/w Dr. Castillo  Monitor GI function, continue IVF  AM BMP reviewed  Bacitracin to blisters  OOB, ambulate  DVT prophy, IS

## 2017-10-06 NOTE — PROGRESS NOTE ADULT - SUBJECTIVE AND OBJECTIVE BOX
CC: F/u for Benign Prostatic Hyperplasia     SUBJECTIVE / OVERNIGHT EVENTS: Patient seen and examined. No acute overnight events. No complaints in AM. Denies any fevers, chills, nausea, and vomiting. Otherwise feels well at this time. Reports passing gas this AM but still no BM.     MEDICATIONS  (STANDING):  BACItracin   Ointment 1 Application(s) Topical two times a day  dextrose 5%. 1000 milliLiter(s) (50 mL/Hr) IV Continuous <Continuous>  dextrose 50% Injectable 12.5 Gram(s) IV Push once  heparin  Injectable 5000 Unit(s) SubCutaneous every 8 hours  influenza   Vaccine 0.5 milliLiter(s) IntraMuscular once  insulin lispro (HumaLOG) corrective regimen sliding scale   SubCutaneous three times a day before meals  insulin lispro (HumaLOG) corrective regimen sliding scale   SubCutaneous at bedtime  oxybutynin 5 milliGRAM(s) Oral every 6 hours  sodium chloride 0.45%. 1000 milliLiter(s) (100 mL/Hr) IV Continuous <Continuous>  trimethoprim / sulfamethoxazole IVPB 430 milliGRAM(s) IV Intermittent every 8 hours    MEDICATIONS  (PRN):  dextrose Gel 1 Dose(s) Oral once PRN Blood Glucose LESS THAN 70 milliGRAM(s)/deciliter  fluticasone propionate 50 MICROgram(s)/spray Nasal Spray 1 Spray(s) Both Nostrils daily PRN wheezing  glucagon  Injectable 1 milliGRAM(s) IntraMuscular once PRN Glucose LESS THAN 70 milligrams/deciliter  HYDROmorphone  Injectable 1 milliGRAM(s) IV Push every 4 hours PRN Severe Pain (7 - 10)  HYDROmorphone  Injectable 0.5 milliGRAM(s) IV Push every 4 hours PRN mild-mod pain  metoclopramide Injectable 10 milliGRAM(s) IV Push every 6 hours PRN Nausea and/or Vomiting  ondansetron Injectable 4 milliGRAM(s) IV Push every 8 hours PRN Nausea and/or Vomiting    PHYSICAL EXAM:  Vital Signs Last 24 Hrs  T(C): 36.7 (06 Oct 2017 14:08), Max: 37.2 (05 Oct 2017 23:29)  T(F): 98.1 (06 Oct 2017 14:08), Max: 99 (05 Oct 2017 23:29)  HR: 99 (06 Oct 2017 14:08) (82 - 102)  BP: 132/73 (06 Oct 2017 14:08) (109/69 - 132/73)  BP(mean): --  RR: 17 (06 Oct 2017 14:08) (17 - 18)  SpO2: 95% (06 Oct 2017 14:08) (94% - 99%)    PHYSICAL EXAM:  GENERAL: NAD, well-developed  HEAD:  Atraumatic, Normocephalic  EYES: EOMI, PERRLA, conjunctiva and sclera clear  NECK: Supple, No JVD  CHEST/LUNG: Clear to auscultation bilaterally; No wheeze  HEART: Regular rate and rhythm; No murmurs, rubs, or gallops  ABDOMEN: Obese, slightly distended, mild tenderness to deep palpation, Bowel sounds present  : KARLI drain with small amount of serosanguinous fluid   EXTREMITIES:  2+ Peripheral Pulses, No clubbing, cyanosis, or edema  PSYCH: AAOx3  NEUROLOGY: non-focal  SKIN: Wound C/D/I    LABS:  CAPILLARY BLOOD GLUCOSE  110 (06 Oct 2017 12:34)  128 (06 Oct 2017 06:23)  199 (06 Oct 2017 00:44)  114 (05 Oct 2017 18:11)                          11.2   12.14 )-----------( 211      ( 05 Oct 2017 06:30 )             34.1   10-06    138  |  101  |  12  ----------------------------<  132<H>  4.5   |  26  |  1.12    Ca    8.3<L>      06 Oct 2017 06:18  Phos  3.9     10-06  Mg     2.2     10-06      RADIOLOGY & ADDITIONAL TESTS:    [ ] Consultant(s) Notes Reviewed:  [x] Care Discussed with Consultants/Other Providers: Urology- Discussed continuing current regiment

## 2017-10-06 NOTE — PROGRESS NOTE ADULT - ASSESSMENT
72 yr old man with a hx of HLD, morbid obesity, type 2 DM, asthma, MALLY on CPAP, and BPH here for robotic suprapubic prostatectomy currently POD #4

## 2017-10-06 NOTE — PROGRESS NOTE ADULT - ASSESSMENT
71 yo M POD #4 robotic suprapubic prostatectomy, umbilical hernia repair, had some postop nausea, now resolved with + GI function.

## 2017-10-07 ENCOUNTER — TRANSCRIPTION ENCOUNTER (OUTPATIENT)
Age: 72
End: 2017-10-07

## 2017-10-07 PROCEDURE — 99233 SBSQ HOSP IP/OBS HIGH 50: CPT

## 2017-10-07 RX ORDER — OXYCODONE AND ACETAMINOPHEN 5; 325 MG/1; MG/1
2 TABLET ORAL EVERY 4 HOURS
Qty: 0 | Refills: 0 | Status: DISCONTINUED | OUTPATIENT
Start: 2017-10-07 | End: 2017-10-08

## 2017-10-07 RX ORDER — LIDOCAINE 4 G/100G
5 CREAM TOPICAL
Qty: 1 | Refills: 0 | OUTPATIENT
Start: 2017-10-07

## 2017-10-07 RX ORDER — BACITRACIN ZINC 500 UNIT/G
1 OINTMENT IN PACKET (EA) TOPICAL
Qty: 0 | Refills: 0 | COMMUNITY
Start: 2017-10-07

## 2017-10-07 RX ORDER — AZTREONAM 2 G
1 VIAL (EA) INJECTION
Qty: 28 | Refills: 0 | OUTPATIENT
Start: 2017-10-07 | End: 2017-10-21

## 2017-10-07 RX ORDER — ACETAMINOPHEN 500 MG
650 TABLET ORAL EVERY 6 HOURS
Qty: 0 | Refills: 0 | Status: DISCONTINUED | OUTPATIENT
Start: 2017-10-07 | End: 2017-10-08

## 2017-10-07 RX ORDER — OXYCODONE AND ACETAMINOPHEN 5; 325 MG/1; MG/1
1 TABLET ORAL EVERY 4 HOURS
Qty: 0 | Refills: 0 | Status: DISCONTINUED | OUTPATIENT
Start: 2017-10-07 | End: 2017-10-08

## 2017-10-07 RX ADMIN — ONDANSETRON 4 MILLIGRAM(S): 8 TABLET, FILM COATED ORAL at 02:47

## 2017-10-07 RX ADMIN — HEPARIN SODIUM 5000 UNIT(S): 5000 INJECTION INTRAVENOUS; SUBCUTANEOUS at 09:13

## 2017-10-07 RX ADMIN — Medication 2: at 09:24

## 2017-10-07 RX ADMIN — SODIUM CHLORIDE 100 MILLILITER(S): 9 INJECTION, SOLUTION INTRAVENOUS at 06:59

## 2017-10-07 RX ADMIN — Medication 5 MILLIGRAM(S): at 11:18

## 2017-10-07 RX ADMIN — Medication 1 APPLICATION(S): at 17:30

## 2017-10-07 RX ADMIN — Medication 5 MILLIGRAM(S): at 06:56

## 2017-10-07 RX ADMIN — Medication 351.25 MILLIGRAM(S): at 14:57

## 2017-10-07 RX ADMIN — Medication 5 MILLIGRAM(S): at 17:32

## 2017-10-07 RX ADMIN — HEPARIN SODIUM 5000 UNIT(S): 5000 INJECTION INTRAVENOUS; SUBCUTANEOUS at 16:58

## 2017-10-07 RX ADMIN — Medication 351.25 MILLIGRAM(S): at 23:33

## 2017-10-07 RX ADMIN — HEPARIN SODIUM 5000 UNIT(S): 5000 INJECTION INTRAVENOUS; SUBCUTANEOUS at 01:20

## 2017-10-07 RX ADMIN — Medication 5 MILLIGRAM(S): at 01:19

## 2017-10-07 RX ADMIN — Medication 351.25 MILLIGRAM(S): at 06:56

## 2017-10-07 RX ADMIN — SODIUM CHLORIDE 100 MILLILITER(S): 9 INJECTION, SOLUTION INTRAVENOUS at 08:59

## 2017-10-07 NOTE — DISCHARGE NOTE ADULT - PLAN OF CARE
Return of good urinary flow Empty ba bag as needed as instructed.  Drink plenty of fluids.  No heavy lifting or straining for 4 to 6 weeks, avoid constipation. You may have intermittent pink tinged urine and small amounts of leakage around ba (due to bladder spasms).  This is normal.   If your urine becomes bright red or with clots, or there is no urine in the bag, please call the office. You may shower, just pat staples dry, they will be removed at your follow up visit.   Call Dr. Castillo to schedule a follow up appointment next week for ba removal and further management.  Call the office if you have fever greater than 101, no urine in bag, pain not relieved with pain medication, nausea/vomiting. Continue current home medications and follow up with your primary care provider

## 2017-10-07 NOTE — PROGRESS NOTE ADULT - PROBLEM SELECTOR PLAN 1
No labs  Color check   Consider advance to [facility], will d/w Dr. Castillo  Monitor GI function, continue IVF  Bacitracin to blisters  OOB, ambulate  DVT prophy, IS  C/W [place] No labs  Monitor GI function, continue IVF  Continue clears for now, possibly advance to regular for dinner if nausea resolves  Keep South for now, Bactrim for duration of South  Bacitracin to blisters  OOB, ambulate  DVT prophy, IS  Follow up medicine recs

## 2017-10-07 NOTE — PROGRESS NOTE ADULT - PROBLEM SELECTOR PLAN 4
Last A1C 6.5  - Hold oral hypoglycemic agents while hospitalized  - Monitor FS's on SS; FS's improved

## 2017-10-07 NOTE — PROGRESS NOTE ADULT - SUBJECTIVE AND OBJECTIVE BOX
Subjective    Objective    Vital signs  T(F): , Max: 98.5 (10-07-17 @ 06:52)  HR: 83 (10-07-17 @ 06:52)  BP: 130/72 (10-07-17 @ 06:52)  SpO2: 99% (10-07-17 @ 06:52)  Wt(kg): --    Output     10-06 @ 07:01  -  10-07 @ 07:00  --------------------------------------------------------  IN: 0 mL / OUT: 4330 mL / NET: -4330 mL    South: 1300  KARLI: 20    Gen  Abd      Labs      10-06 @ 06:18    WBC --    / Hct --    / SCr 1.12       Urine Cx: ?  Blood Cx: ?    Imaging Complains of some nausea overnight, but no vomiting.  Passing flatus, last BM 2 days ago.  OOB walking in halls.    AF  103/72  83  99%  South: 1300 Clear yellow  KARLI: 20    Gen NAD, pleasant  Abd Softly distended, blisters on abdomen from reaction to adhesive  Umbilicus tender   South in place, secured with StatLock    SCr 1.12

## 2017-10-07 NOTE — DISCHARGE NOTE ADULT - MEDICATION SUMMARY - MEDICATIONS TO CHANGE
I will SWITCH the dose or number of times a day I take the medications listed below when I get home from the hospital:    oxybutynin 5 mg oral tablet  -- 1 tab(s) by mouth once a day in pm I will SWITCH the dose or number of times a day I take the medications listed below when I get home from the hospital:  None

## 2017-10-07 NOTE — DISCHARGE NOTE ADULT - CARE PLAN
Principal Discharge DX:	Benign prostate hyperplasia  Goal:	Return of good urinary flow  Instructions for follow-up, activity and diet:	Empty ba bag as needed as instructed.  Drink plenty of fluids.  No heavy lifting or straining for 4 to 6 weeks, avoid constipation. You may have intermittent pink tinged urine and small amounts of leakage around ba (due to bladder spasms).  This is normal.   If your urine becomes bright red or with clots, or there is no urine in the bag, please call the office. You may shower, just pat staples dry, they will be removed at your follow up visit.   Call Dr. Castillo to schedule a follow up appointment next week for ba removal and further management.  Call the office if you have fever greater than 101, no urine in bag, pain not relieved with pain medication, nausea/vomiting.  Secondary Diagnosis:	Diabetes mellitus  Instructions for follow-up, activity and diet:	Continue current home medications and follow up with your primary care provider

## 2017-10-07 NOTE — PROGRESS NOTE ADULT - SUBJECTIVE AND OBJECTIVE BOX
CC: F/u for Benign Prostatic Hyperplasia     SUBJECTIVE / OVERNIGHT EVENTS: Patient seen and examined. No acute overnight events. No complaints in AM other than some mild gas production. Denies any fevers, chills, nausea, and vomiting. Otherwise feels well at this time. Reports passing gas along with BM yesterday.      MEDICATIONS  (STANDING):  BACItracin   Ointment 1 Application(s) Topical two times a day  dextrose 5%. 1000 milliLiter(s) (50 mL/Hr) IV Continuous <Continuous>  dextrose 50% Injectable 12.5 Gram(s) IV Push once  heparin  Injectable 5000 Unit(s) SubCutaneous every 8 hours  influenza   Vaccine 0.5 milliLiter(s) IntraMuscular once  insulin lispro (HumaLOG) corrective regimen sliding scale   SubCutaneous three times a day before meals  insulin lispro (HumaLOG) corrective regimen sliding scale   SubCutaneous at bedtime  oxybutynin 5 milliGRAM(s) Oral every 6 hours  sodium chloride 0.45%. 1000 milliLiter(s) (100 mL/Hr) IV Continuous <Continuous>  trimethoprim / sulfamethoxazole IVPB 430 milliGRAM(s) IV Intermittent every 8 hours    MEDICATIONS  (PRN):  dextrose Gel 1 Dose(s) Oral once PRN Blood Glucose LESS THAN 70 milliGRAM(s)/deciliter  fluticasone propionate 50 MICROgram(s)/spray Nasal Spray 1 Spray(s) Both Nostrils daily PRN wheezing  glucagon  Injectable 1 milliGRAM(s) IntraMuscular once PRN Glucose LESS THAN 70 milligrams/deciliter  HYDROmorphone  Injectable 1 milliGRAM(s) IV Push every 4 hours PRN Severe Pain (7 - 10)  HYDROmorphone  Injectable 0.5 milliGRAM(s) IV Push every 4 hours PRN mild-mod pain  metoclopramide Injectable 10 milliGRAM(s) IV Push every 6 hours PRN Nausea and/or Vomiting  ondansetron Injectable 4 milliGRAM(s) IV Push every 8 hours PRN Nausea and/or Vomiting    PHYSICAL EXAM:  Vital Signs Last 24 Hrs  T(C): 36.9 (07 Oct 2017 06:52), Max: 36.9 (06 Oct 2017 22:27)  T(F): 98.5 (07 Oct 2017 06:52), Max: 98.5 (07 Oct 2017 06:52)  HR: 83 (07 Oct 2017 06:52) (80 - 99)  BP: 130/72 (07 Oct 2017 06:52) (113/65 - 136/66)  BP(mean): --  RR: 17 (07 Oct 2017 06:52) (17 - 17)  SpO2: 99% (07 Oct 2017 06:52) (92% - 99%)    PHYSICAL EXAM:  GENERAL: NAD, well-developed  NECK: Supple, No JVD  CHEST/LUNG: Clear to auscultation bilaterally; No wheeze  HEART: Regular rate and rhythm; No murmurs, rubs, or gallops  ABDOMEN: Obese, NT, ND, Bowel sounds present  : KARLI drain with small amount of serosanguinous fluid   EXTREMITIES:  2+ Peripheral Pulses, No clubbing, cyanosis, or edema  PSYCH: AAOx3  NEUROLOGY: non-focal  SKIN: Wound C/D/I    LABS:  CAPILLARY BLOOD GLUCOSE  151 (07 Oct 2017 08:52)  134 (06 Oct 2017 22:54)  174 (06 Oct 2017 17:31)  110 (06 Oct 2017 12:34)    10-06    138  |  101  |  12  ----------------------------<  132<H>  4.5   |  26  |  1.12    Ca    8.3<L>      06 Oct 2017 06:18  Phos  3.9     10-06  Mg     2.2     10-06      RADIOLOGY & ADDITIONAL TESTS:    [ ] Consultant(s) Notes Reviewed:  [x] Care Discussed with Consultants/Other Providers: Urology- Discussed continuing current regiment

## 2017-10-07 NOTE — PROGRESS NOTE ADULT - ASSESSMENT
71 yo M POD #5 robotic suprapubic prostatectomy, umbilical hernia repair, had some postop nausea, now resolved with + GI function. 73 yo M POD #5 robotic suprapubic prostatectomy, umbilical hernia repair

## 2017-10-07 NOTE — DISCHARGE NOTE ADULT - HOSPITAL COURSE
71 yo M underwent robotic suprapubic prostatectomy, umbilical hernia repair on 10/2/17.  CBI clamped on POD #2, urine remained clear.  Pain controlled, ambulating.  Return of GI function on POD #4, diet advanced without incident.  KARLI Cr < serum, KARLI d/c and pt d/c with ba to leg bag on POD #5 to f/u with Dr. Castillo 71 yo M underwent robotic suprapubic prostatectomy, umbilical hernia repair on 10/2/17.  CBI clamped on POD #2, urine remained clear.  Pain controlled, ambulating.  Return of GI function on POD #4, diet advanced without incident.  KARLI Cr < serum, KARLI d/c and pt d/c with ba to leg bag on POD #5 to f/u with Dr. Castillo.  CT Cystogram in 2 weeks

## 2017-10-07 NOTE — DISCHARGE NOTE ADULT - ADDITIONAL INSTRUCTIONS
Please follow up with your primary care physician regarding your hospitalization within 2 weeks after your discharge. Please follow up with your primary care physician regarding your hospitalization within 2 weeks after your discharge.    Please stop taking oxybutynin 2 days before visiting Dr. Castillo in the office.    f/u in 2 weeks for CT cystogram

## 2017-10-07 NOTE — DISCHARGE NOTE ADULT - PATIENT PORTAL LINK FT
“You can access the [facility] Patient Portal, offered by [facility], by registering with the following website: [email or web address]”

## 2017-10-07 NOTE — DISCHARGE NOTE ADULT - MEDICATION SUMMARY - MEDICATIONS TO TAKE
I will START or STAY ON the medications listed below when I get home from the hospital:    oxyCODONE-acetaminophen 5 mg-325 mg oral tablet  -- 1 to 2 tab(s) by mouth every 4 to 6 hours as needed for pain MDD:6  -- Caution federal law prohibits the transfer of this drug to any person other  than the person for whom it was prescribed.  May cause drowsiness.  Alcohol may intensify this effect.  Use care when operating dangerous machinery.  This prescription cannot be refilled.  This product contains acetaminophen.  Do not use  with any other product containing acetaminophen to prevent possible liver damage.  Using more of this medication than prescribed may cause serious breathing problems.    -- Indication: For Pain    metFORMIN 500 mg oral tablet  -- 2 tab(s) by mouth once a day (at bedtime)  -- Indication: For Home med    simvastatin 40 mg oral tablet  -- 1 tab(s) by mouth once a day (at bedtime)  -- Indication: For Home med    bacitracin 500 units/g topical ointment  -- 1 application on skin 2 times a day  -- Indication: For Skin abrasion    lidocaine 2% topical gel with applicator  -- Apply on skin to tip of penis as needed for catheter irritation    -- For external use only.    -- Indication: For Catheter irritation    Colace 100 mg oral capsule  -- 1 cap(s) by mouth 3 times a day, As Needed for constipation  -- Indication: For Constipation    Senna 8.6 mg oral tablet  -- 2 tab(s) by mouth once a day (at bedtime), As Needed for constipation  -- Indication: For Constipation    Bactrim  mg-160 mg oral tablet  -- 1 tab(s) by mouth 2 times a day   -- Avoid prolonged or excessive exposure to direct and/or artificial sunlight while taking this medication.  Finish all this medication unless otherwise directed by prescriber.  Medication should be taken with plenty of water.    -- Indication: For Antibiotic    fluticasone 50 mcg/inh nasal spray  -- 1 spray(s) into nose once a day, As Needed  -- Indication: For Home med    oxybutynin 5 mg oral tablet  -- 1 tab(s) by mouth once a day in pm, stop taking the day before your appointment with Dr. Castillo  -- Indication: For Home med, stop the day before your appointment with Dr. Castillo I will START or STAY ON the medications listed below when I get home from the hospital:    oxyCODONE-acetaminophen 5 mg-325 mg oral tablet  -- 1 to 2 tab(s) by mouth every 4 to 6 hours as needed for pain MDD:6  -- Caution federal law prohibits the transfer of this drug to any person other  than the person for whom it was prescribed.  May cause drowsiness.  Alcohol may intensify this effect.  Use care when operating dangerous machinery.  This prescription cannot be refilled.  This product contains acetaminophen.  Do not use  with any other product containing acetaminophen to prevent possible liver damage.  Using more of this medication than prescribed may cause serious breathing problems.    -- Indication: For Pain    metFORMIN 500 mg oral tablet  -- 2 tab(s) by mouth once a day (at bedtime)  -- Indication: For Home med    simvastatin 40 mg oral tablet  -- 1 tab(s) by mouth once a day (at bedtime)  -- Indication: For Home med    lidocaine 2% topical gel with applicator  -- Apply on skin to tip of penis as needed for catheter irritation    -- For external use only.    -- Indication: For Catheter irritation    bacitracin 500 units/g topical ointment  -- 1 application on skin 2 times a day  -- Indication: For Skin abrasion    Colace 100 mg oral capsule  -- 1 cap(s) by mouth 3 times a day, As Needed for constipation  -- Indication: For Constipation    Senna 8.6 mg oral tablet  -- 2 tab(s) by mouth once a day (at bedtime), As Needed for constipation  -- Indication: For Constipation    Bactrim  mg-160 mg oral tablet  -- 1 tab(s) by mouth 2 times a day   -- Avoid prolonged or excessive exposure to direct and/or artificial sunlight while taking this medication.  Finish all this medication unless otherwise directed by prescriber.  Medication should be taken with plenty of water.    -- Indication: For Antibiotic    fluticasone 50 mcg/inh nasal spray  -- 1 spray(s) into nose once a day, As Needed  -- Indication: For Home med    oxybutynin 5 mg oral tablet  -- 1 tab(s) by mouth once a day in pm, stop taking 2 day before your appointment with Dr. Castillo  -- Indication: For Antispasmodic I will START or STAY ON the medications listed below when I get home from the hospital:    oxyCODONE-acetaminophen 5 mg-325 mg oral tablet  -- 1 to 2 tab(s) by mouth every 4 to 6 hours as needed for pain MDD:4  -- Caution federal law prohibits the transfer of this drug to any person other  than the person for whom it was prescribed.  May cause drowsiness.  Alcohol may intensify this effect.  Use care when operating dangerous machinery.  This prescription cannot be refilled.  This product contains acetaminophen.  Do not use  with any other product containing acetaminophen to prevent possible liver damage.  Using more of this medication than prescribed may cause serious breathing problems.    -- Indication: For Pain    metFORMIN 500 mg oral tablet  -- 2 tab(s) by mouth once a day (at bedtime)  -- Indication: For Home med    simvastatin 40 mg oral tablet  -- 1 tab(s) by mouth once a day (at bedtime)  -- Indication: For Home med    bacitracin 500 units/g topical ointment  -- Apply on skin to affected area 2 times a day, As Needed  -- Indication: For Skin irritation    lidocaine 2% topical gel with applicator  -- Apply on skin to tip of penis as needed for catheter irritation    -- For external use only.    -- Indication: For Catheter irritation    Colace 100 mg oral capsule  -- 1 cap(s) by mouth 3 times a day, As Needed for constipation  -- Indication: For Constipation    Senna 8.6 mg oral tablet  -- 2 tab(s) by mouth once a day (at bedtime), As Needed for constipation  -- Indication: For Constipation    Bactrim  mg-160 mg oral tablet  -- 1 tab(s) by mouth 2 times a day MDD:2  -- Avoid prolonged or excessive exposure to direct and/or artificial sunlight while taking this medication.  Finish all this medication unless otherwise directed by prescriber.  Medication should be taken with plenty of water.    -- Indication: For Antibiotic    fluticasone 50 mcg/inh nasal spray  -- 1 spray(s) into nose once a day, As Needed  -- Indication: For Home med    oxybutynin 5 mg oral tablet  -- 1 tab(s) by mouth once a day MDD:1, Please stop 2 days before seeing Dr. Castillo in office  -- May cause drowsiness.  Alcohol may intensify this effect.  Use care when operating dangerous machinery.    -- Indication: For Bladder spasms

## 2017-10-07 NOTE — PROGRESS NOTE ADULT - ATTENDING COMMENTS
Pt seen and examined  abd soft  some 'indigestion' this am, but minimal nausea and no further vomiting  agree with plan above for diet advancement

## 2017-10-07 NOTE — PROGRESS NOTE ADULT - ASSESSMENT
72 yr old man with a hx of HLD, morbid obesity, type 2 DM, asthma, MALLY on CPAP, and BPH here for robotic suprapubic prostatectomy currently POD #5

## 2017-10-08 VITALS
TEMPERATURE: 98 F | HEART RATE: 88 BPM | RESPIRATION RATE: 16 BRPM | SYSTOLIC BLOOD PRESSURE: 118 MMHG | OXYGEN SATURATION: 97 % | DIASTOLIC BLOOD PRESSURE: 66 MMHG

## 2017-10-08 PROCEDURE — 99233 SBSQ HOSP IP/OBS HIGH 50: CPT

## 2017-10-08 RX ORDER — AZTREONAM 2 G
1 VIAL (EA) INJECTION
Qty: 28 | Refills: 0 | OUTPATIENT
Start: 2017-10-08 | End: 2017-10-22

## 2017-10-08 RX ORDER — BENZOCAINE AND MENTHOL 5; 1 G/100ML; G/100ML
1 LIQUID ORAL EVERY 6 HOURS
Qty: 0 | Refills: 0 | Status: DISCONTINUED | OUTPATIENT
Start: 2017-10-08 | End: 2017-10-08

## 2017-10-08 RX ORDER — OXYBUTYNIN CHLORIDE 5 MG
1 TABLET ORAL
Qty: 12 | Refills: 0 | OUTPATIENT
Start: 2017-10-08 | End: 2017-10-20

## 2017-10-08 RX ORDER — LIDOCAINE 4 G/100G
5 CREAM TOPICAL
Qty: 1 | Refills: 0 | OUTPATIENT
Start: 2017-10-08 | End: 2017-10-22

## 2017-10-08 RX ADMIN — Medication 5 MILLIGRAM(S): at 12:15

## 2017-10-08 RX ADMIN — Medication 5 MILLIGRAM(S): at 07:40

## 2017-10-08 RX ADMIN — Medication 2: at 09:25

## 2017-10-08 RX ADMIN — HEPARIN SODIUM 5000 UNIT(S): 5000 INJECTION INTRAVENOUS; SUBCUTANEOUS at 09:25

## 2017-10-08 RX ADMIN — BENZOCAINE AND MENTHOL 1 LOZENGE: 5; 1 LIQUID ORAL at 07:40

## 2017-10-08 RX ADMIN — HEPARIN SODIUM 5000 UNIT(S): 5000 INJECTION INTRAVENOUS; SUBCUTANEOUS at 01:13

## 2017-10-08 RX ADMIN — Medication 351.25 MILLIGRAM(S): at 07:40

## 2017-10-08 RX ADMIN — Medication 5 MILLIGRAM(S): at 01:13

## 2017-10-08 NOTE — PROGRESS NOTE ADULT - SUBJECTIVE AND OBJECTIVE BOX
CC: F/u for Benign Prostatic Hyperplasia     SUBJECTIVE / OVERNIGHT EVENTS: Patient seen and examined. No acute overnight events. No complaints in AM other than some mild gas production. Denies any fevers, chills, nausea, and vomiting. Otherwise feels well at this time. Reports passing gas.     MEDICATIONS  (STANDING):  BACItracin   Ointment 1 Application(s) Topical two times a day  dextrose 5%. 1000 milliLiter(s) (50 mL/Hr) IV Continuous <Continuous>  dextrose 50% Injectable 12.5 Gram(s) IV Push once  heparin  Injectable 5000 Unit(s) SubCutaneous every 8 hours  influenza   Vaccine 0.5 milliLiter(s) IntraMuscular once  insulin lispro (HumaLOG) corrective regimen sliding scale   SubCutaneous three times a day before meals  insulin lispro (HumaLOG) corrective regimen sliding scale   SubCutaneous at bedtime  oxybutynin 5 milliGRAM(s) Oral every 6 hours  sodium chloride 0.45%. 1000 milliLiter(s) (100 mL/Hr) IV Continuous <Continuous>  trimethoprim / sulfamethoxazole IVPB 430 milliGRAM(s) IV Intermittent every 8 hours    MEDICATIONS  (PRN):  dextrose Gel 1 Dose(s) Oral once PRN Blood Glucose LESS THAN 70 milliGRAM(s)/deciliter  fluticasone propionate 50 MICROgram(s)/spray Nasal Spray 1 Spray(s) Both Nostrils daily PRN wheezing  glucagon  Injectable 1 milliGRAM(s) IntraMuscular once PRN Glucose LESS THAN 70 milligrams/deciliter  HYDROmorphone  Injectable 1 milliGRAM(s) IV Push every 4 hours PRN Severe Pain (7 - 10)  HYDROmorphone  Injectable 0.5 milliGRAM(s) IV Push every 4 hours PRN mild-mod pain  metoclopramide Injectable 10 milliGRAM(s) IV Push every 6 hours PRN Nausea and/or Vomiting  ondansetron Injectable 4 milliGRAM(s) IV Push every 8 hours PRN Nausea and/or Vomiting    PHYSICAL EXAM:  Vital Signs Last 24 Hrs  T(C): 36.9 (08 Oct 2017 07:15), Max: 36.9 (08 Oct 2017 07:15)  T(F): 98.5 (08 Oct 2017 07:15), Max: 98.5 (08 Oct 2017 07:15)  HR: 94 (08 Oct 2017 07:15) (84 - 101)  BP: 120/60 (08 Oct 2017 07:15) (105/59 - 128/63)  BP(mean): --  RR: 16 (08 Oct 2017 07:15) (16 - 18)  SpO2: 98% (08 Oct 2017 07:15) (93% - 98%)    PHYSICAL EXAM:  GENERAL: NAD, well-developed  NECK: Supple, No JVD  CHEST/LUNG: Clear to auscultation bilaterally; No wheeze  HEART: Regular rate and rhythm; No murmurs, rubs, or gallops  ABDOMEN: Obese, NT, ND, Bowel sounds present  : KARLI drain with small amount of serosanguinous fluid   EXTREMITIES:  2+ Peripheral Pulses, No clubbing, cyanosis, or edema  PSYCH: AAOx3  NEUROLOGY: non-focal  SKIN: Wound C/D/I    LABS:  CAPILLARY BLOOD GLUCOSE  194 (08 Oct 2017 08:20)  136 (07 Oct 2017 21:36)  97 (07 Oct 2017 17:13)  147 (07 Oct 2017 12:03)    RADIOLOGY & ADDITIONAL TESTS:    [ ] Consultant(s) Notes Reviewed:  [x] Care Discussed with Consultants/Other Providers: Urology- Discussed continuing current regiment

## 2017-10-08 NOTE — PROGRESS NOTE ADULT - PROBLEM SELECTOR PROBLEM 1
Benign prostate hyperplasia
Benign prostatic hyperplasia, unspecified whether lower urinary tract symptoms present
Benign prostate hyperplasia
Benign prostatic hyperplasia, unspecified whether lower urinary tract symptoms present

## 2017-10-08 NOTE — PROGRESS NOTE ADULT - PROBLEM SELECTOR PLAN 1
No labs  Regular diet  Keep South for now, Bactrim for duration of South  Bacitracin to blisters  Dc home

## 2017-10-08 NOTE — PROGRESS NOTE ADULT - PROBLEM SELECTOR PLAN 2
Mild drop from 15 > 12.3 > 11.2   - Transfuse for Hgb < 7  - Check CBC per primary team
Likely reactive; low suspicion for infectious etiology at this time  - Trend WBC count daily  - If fever or hemodynamic instability, check BCx.
Likely reactive; low suspicion for infectious etiology at this time  - Trend WBC count daily  - If fever or hemodynamic instability, check BCx.
Mild drop from 15 > 12.3 > 11.2   - Transfuse for Hgb < 7  - Check CBC per primary team
Likely reactive; low suspicion for infectious etiology at this time  - Trend WBC count daily  - If fever or hemodynamic instability, check BCx.

## 2017-10-08 NOTE — PROGRESS NOTE ADULT - SUBJECTIVE AND OBJECTIVE BOX
Patient stayed overnight 2/2 nausea.  Tolerating regular diet, feeling well.  Ready for dc.    T(F): 97.8, Max: 98.5 (10-07-17 @ 06:52)  HR: 84  BP: 128/63  SpO2: 97%    OUT:    Bulb: 7.5 mL    Indwelling Catheter - Urethral: 2050 mL    Gen NAD  Abd Soft, moderately distended   South in place draining clear yellow urine Patient stayed overnight 2/2 nausea.  Tolerating regular diet, feeling well. Pain controlled.   Ready for dc.    T(F): 97.8, Max: 98.5 (10-07-17 @ 06:52)  HR: 84  BP: 128/63  SpO2: 97%    OUT:    Bulb: 7.5 mL serous    Indwelling Catheter - Urethral: 2050 mL clear    Gen NAD  Abd Soft, moderately distended   South in place draining clear yellow urine

## 2017-10-08 NOTE — PROGRESS NOTE ADULT - ASSESSMENT
72 yr old man with a hx of HLD, morbid obesity, type 2 DM, asthma, MALLY on CPAP, and BPH here for robotic suprapubic prostatectomy currently POD #6

## 2017-10-08 NOTE — PROGRESS NOTE ADULT - PROVIDER SPECIALTY LIST ADULT
Hospitalist
Hospitalist
Urology
Hospitalist
Urology
Hospitalist
Hospitalist

## 2017-10-08 NOTE — PROGRESS NOTE ADULT - PROBLEM SELECTOR PLAN 1
- Postoperative care per primary team  - Encourage early ambulation  - Incentive spirometry  - DVT ppx  - Advance diet as tolerated; currently on full diet and tolerating   - Pain control  - C/w oxybutnin   - IVF

## 2017-10-08 NOTE — PROGRESS NOTE ADULT - PROBLEM SELECTOR PROBLEM 2
Postoperative anemia due to acute blood loss
Leukocytosis, unspecified type
Leukocytosis, unspecified type
Postoperative anemia due to acute blood loss
Leukocytosis, unspecified type

## 2017-10-09 LAB — SURGICAL PATHOLOGY STUDY: SIGNIFICANT CHANGE UP

## 2017-10-12 ENCOUNTER — TRANSCRIPTION ENCOUNTER (OUTPATIENT)
Age: 72
End: 2017-10-12

## 2017-10-13 ENCOUNTER — RECORD ABSTRACTING (OUTPATIENT)
Age: 72
End: 2017-10-13

## 2017-10-16 ENCOUNTER — TRANSCRIPTION ENCOUNTER (OUTPATIENT)
Age: 72
End: 2017-10-16

## 2017-10-18 ENCOUNTER — FORM ENCOUNTER (OUTPATIENT)
Age: 72
End: 2017-10-18

## 2017-10-19 ENCOUNTER — APPOINTMENT (OUTPATIENT)
Dept: CT IMAGING | Facility: IMAGING CENTER | Age: 72
End: 2017-10-19
Payer: MEDICARE

## 2017-10-19 ENCOUNTER — OUTPATIENT (OUTPATIENT)
Dept: OUTPATIENT SERVICES | Facility: HOSPITAL | Age: 72
LOS: 1 days | End: 2017-10-19
Payer: MEDICARE

## 2017-10-19 ENCOUNTER — APPOINTMENT (OUTPATIENT)
Dept: UROLOGY | Facility: CLINIC | Age: 72
End: 2017-10-19
Payer: MEDICARE

## 2017-10-19 DIAGNOSIS — Z98.890 OTHER SPECIFIED POSTPROCEDURAL STATES: Chronic | ICD-10-CM

## 2017-10-19 DIAGNOSIS — N40.0 BENIGN PROSTATIC HYPERPLASIA WITHOUT LOWER URINARY TRACT SYMPTOMS: ICD-10-CM

## 2017-10-19 DIAGNOSIS — Z12.11 ENCOUNTER FOR SCREENING FOR MALIGNANT NEOPLASM OF COLON: Chronic | ICD-10-CM

## 2017-10-19 PROCEDURE — 99024 POSTOP FOLLOW-UP VISIT: CPT

## 2017-10-19 PROCEDURE — 72192 CT PELVIS W/O DYE: CPT | Mod: 26

## 2017-10-19 PROCEDURE — 72192 CT PELVIS W/O DYE: CPT

## 2017-10-24 ENCOUNTER — APPOINTMENT (OUTPATIENT)
Dept: UROLOGY | Facility: CLINIC | Age: 72
End: 2017-10-24

## 2017-10-27 ENCOUNTER — APPOINTMENT (OUTPATIENT)
Dept: UROLOGY | Facility: CLINIC | Age: 72
End: 2017-10-27

## 2017-10-30 RX ORDER — TAMSULOSIN HYDROCHLORIDE 0.4 MG/1
1 CAPSULE ORAL
Qty: 0 | Refills: 0 | COMMUNITY

## 2017-10-30 RX ORDER — FLUTICASONE PROPIONATE 50 MCG
1 SPRAY, SUSPENSION NASAL
Qty: 0 | Refills: 0 | COMMUNITY

## 2017-10-30 RX ORDER — OXYBUTYNIN CHLORIDE 5 MG
1 TABLET ORAL
Qty: 0 | Refills: 0 | COMMUNITY

## 2017-10-30 RX ORDER — SENNA PLUS 8.6 MG/1
2 TABLET ORAL
Qty: 0 | Refills: 0 | COMMUNITY

## 2017-10-30 RX ORDER — SIMVASTATIN 20 MG/1
1 TABLET, FILM COATED ORAL
Qty: 0 | Refills: 0 | COMMUNITY

## 2017-10-30 RX ORDER — DOCUSATE SODIUM 100 MG
1 CAPSULE ORAL
Qty: 0 | Refills: 0 | COMMUNITY

## 2017-10-30 RX ORDER — METFORMIN HYDROCHLORIDE 850 MG/1
2 TABLET ORAL
Qty: 0 | Refills: 0 | COMMUNITY

## 2017-10-30 RX ORDER — NALTREXONE HYDROCHLORIDE 50 MG/1
1 TABLET, FILM COATED ORAL
Qty: 0 | Refills: 0 | COMMUNITY

## 2017-10-30 RX ORDER — ZONISAMIDE 100 MG
1 CAPSULE ORAL
Qty: 0 | Refills: 0 | COMMUNITY

## 2017-10-30 RX ORDER — AZELASTINE 137 UG/1
2 SPRAY, METERED NASAL
Qty: 0 | Refills: 0 | COMMUNITY

## 2017-11-05 ENCOUNTER — FORM ENCOUNTER (OUTPATIENT)
Age: 72
End: 2017-11-05

## 2017-11-06 ENCOUNTER — APPOINTMENT (OUTPATIENT)
Dept: NUCLEAR MEDICINE | Facility: IMAGING CENTER | Age: 72
End: 2017-11-06
Payer: MEDICARE

## 2017-11-06 ENCOUNTER — OUTPATIENT (OUTPATIENT)
Dept: OUTPATIENT SERVICES | Facility: HOSPITAL | Age: 72
LOS: 1 days | End: 2017-11-06
Payer: MEDICARE

## 2017-11-06 DIAGNOSIS — Z98.890 OTHER SPECIFIED POSTPROCEDURAL STATES: Chronic | ICD-10-CM

## 2017-11-06 DIAGNOSIS — M89.9 DISORDER OF BONE, UNSPECIFIED: ICD-10-CM

## 2017-11-06 DIAGNOSIS — Z12.11 ENCOUNTER FOR SCREENING FOR MALIGNANT NEOPLASM OF COLON: Chronic | ICD-10-CM

## 2017-11-06 DIAGNOSIS — N40.0 BENIGN PROSTATIC HYPERPLASIA WITHOUT LOWER URINARY TRACT SYMPTOMS: ICD-10-CM

## 2017-11-06 PROCEDURE — 78320: CPT | Mod: 26

## 2017-11-06 PROCEDURE — 78306 BONE IMAGING WHOLE BODY: CPT | Mod: 26

## 2017-11-06 PROCEDURE — 78306 BONE IMAGING WHOLE BODY: CPT

## 2017-11-06 PROCEDURE — A9561: CPT

## 2017-11-06 PROCEDURE — 78999 UNLISTED MISC PX DX NUC MED: CPT

## 2017-11-14 ENCOUNTER — FORM ENCOUNTER (OUTPATIENT)
Age: 72
End: 2017-11-14

## 2017-11-17 ENCOUNTER — RECORD ABSTRACTING (OUTPATIENT)
Age: 72
End: 2017-11-17

## 2017-11-21 ENCOUNTER — APPOINTMENT (OUTPATIENT)
Dept: UROLOGY | Facility: CLINIC | Age: 72
End: 2017-11-21
Payer: MEDICARE

## 2017-11-21 PROCEDURE — 99024 POSTOP FOLLOW-UP VISIT: CPT

## 2017-12-01 ENCOUNTER — FORM ENCOUNTER (OUTPATIENT)
Age: 72
End: 2017-12-01

## 2017-12-02 ENCOUNTER — OUTPATIENT (OUTPATIENT)
Dept: OUTPATIENT SERVICES | Facility: HOSPITAL | Age: 72
LOS: 1 days | End: 2017-12-02
Payer: MEDICARE

## 2017-12-02 ENCOUNTER — APPOINTMENT (OUTPATIENT)
Dept: MRI IMAGING | Facility: IMAGING CENTER | Age: 72
End: 2017-12-02
Payer: MEDICARE

## 2017-12-02 DIAGNOSIS — M89.9 DISORDER OF BONE, UNSPECIFIED: ICD-10-CM

## 2017-12-02 DIAGNOSIS — Z98.890 OTHER SPECIFIED POSTPROCEDURAL STATES: Chronic | ICD-10-CM

## 2017-12-02 DIAGNOSIS — Z12.11 ENCOUNTER FOR SCREENING FOR MALIGNANT NEOPLASM OF COLON: Chronic | ICD-10-CM

## 2017-12-02 PROCEDURE — A9585: CPT

## 2017-12-02 PROCEDURE — 73723 MRI JOINT LWR EXTR W/O&W/DYE: CPT | Mod: 26,RT

## 2017-12-02 PROCEDURE — 82565 ASSAY OF CREATININE: CPT

## 2017-12-02 PROCEDURE — 73723 MRI JOINT LWR EXTR W/O&W/DYE: CPT

## 2017-12-14 RX ORDER — CIPROFLOXACIN HYDROCHLORIDE 500 MG/1
500 TABLET, FILM COATED ORAL
Qty: 14 | Refills: 0 | Status: COMPLETED | COMMUNITY
Start: 2017-08-28 | End: 2017-12-14

## 2017-12-14 RX ORDER — SULFAMETHOXAZOLE AND TRIMETHOPRIM 800; 160 MG/1; MG/1
800-160 TABLET ORAL
Qty: 6 | Refills: 0 | Status: COMPLETED | COMMUNITY
Start: 2017-09-19 | End: 2017-12-14

## 2018-04-18 ENCOUNTER — FORM ENCOUNTER (OUTPATIENT)
Age: 73
End: 2018-04-18

## 2018-04-27 ENCOUNTER — FORM ENCOUNTER (OUTPATIENT)
Age: 73
End: 2018-04-27

## 2018-04-28 ENCOUNTER — APPOINTMENT (OUTPATIENT)
Dept: MRI IMAGING | Facility: IMAGING CENTER | Age: 73
End: 2018-04-28
Payer: MEDICARE

## 2018-04-28 ENCOUNTER — OUTPATIENT (OUTPATIENT)
Dept: OUTPATIENT SERVICES | Facility: HOSPITAL | Age: 73
LOS: 1 days | End: 2018-04-28
Payer: MEDICARE

## 2018-04-28 DIAGNOSIS — M89.9 DISORDER OF BONE, UNSPECIFIED: ICD-10-CM

## 2018-04-28 DIAGNOSIS — Z98.890 OTHER SPECIFIED POSTPROCEDURAL STATES: Chronic | ICD-10-CM

## 2018-04-28 DIAGNOSIS — Z12.11 ENCOUNTER FOR SCREENING FOR MALIGNANT NEOPLASM OF COLON: Chronic | ICD-10-CM

## 2018-04-28 PROCEDURE — 73723 MRI JOINT LWR EXTR W/O&W/DYE: CPT | Mod: 26,RT

## 2018-04-28 PROCEDURE — 73723 MRI JOINT LWR EXTR W/O&W/DYE: CPT | Mod: 26,76,LT

## 2018-04-28 PROCEDURE — 73723 MRI JOINT LWR EXTR W/O&W/DYE: CPT

## 2018-04-28 PROCEDURE — 82565 ASSAY OF CREATININE: CPT

## 2018-04-28 PROCEDURE — 72197 MRI PELVIS W/O & W/DYE: CPT | Mod: 26

## 2018-04-28 PROCEDURE — A9585: CPT

## 2018-04-28 PROCEDURE — 72197 MRI PELVIS W/O & W/DYE: CPT

## 2018-05-29 ENCOUNTER — APPOINTMENT (OUTPATIENT)
Dept: UROLOGY | Facility: CLINIC | Age: 73
End: 2018-05-29
Payer: MEDICARE

## 2018-05-29 PROCEDURE — 99213 OFFICE O/P EST LOW 20 MIN: CPT

## 2018-08-15 ENCOUNTER — FORM ENCOUNTER (OUTPATIENT)
Age: 73
End: 2018-08-15

## 2019-04-04 PROBLEM — I73.9 PERIPHERAL VASCULAR DISEASE, UNSPECIFIED: Chronic | Status: ACTIVE | Noted: 2017-09-14

## 2019-04-04 PROBLEM — K42.9 UMBILICAL HERNIA WITHOUT OBSTRUCTION OR GANGRENE: Chronic | Status: ACTIVE | Noted: 2017-05-31

## 2019-05-18 ENCOUNTER — TRANSCRIPTION ENCOUNTER (OUTPATIENT)
Age: 74
End: 2019-05-18

## 2019-06-03 ENCOUNTER — APPOINTMENT (OUTPATIENT)
Dept: UROLOGY | Facility: CLINIC | Age: 74
End: 2019-06-03

## 2019-06-04 ENCOUNTER — APPOINTMENT (OUTPATIENT)
Dept: UROLOGY | Facility: CLINIC | Age: 74
End: 2019-06-04

## 2019-06-04 ENCOUNTER — TRANSCRIPTION ENCOUNTER (OUTPATIENT)
Age: 74
End: 2019-06-04

## 2019-08-12 ENCOUNTER — APPOINTMENT (OUTPATIENT)
Dept: UROLOGY | Facility: CLINIC | Age: 74
End: 2019-08-12

## 2019-10-23 ENCOUNTER — APPOINTMENT (OUTPATIENT)
Dept: UROLOGY | Facility: CLINIC | Age: 74
End: 2019-10-23
Payer: MEDICARE

## 2019-10-23 VITALS
SYSTOLIC BLOOD PRESSURE: 115 MMHG | RESPIRATION RATE: 18 BRPM | DIASTOLIC BLOOD PRESSURE: 82 MMHG | HEART RATE: 80 BPM | TEMPERATURE: 98 F | HEIGHT: 73 IN | WEIGHT: 273 LBS | BODY MASS INDEX: 36.18 KG/M2

## 2019-10-23 DIAGNOSIS — Z00.00 ENCOUNTER FOR GENERAL ADULT MEDICAL EXAMINATION W/OUT ABNORMAL FINDINGS: ICD-10-CM

## 2019-10-23 PROCEDURE — 99214 OFFICE O/P EST MOD 30 MIN: CPT

## 2019-10-23 NOTE — ADDENDUM
[FreeTextEntry1] : This note was authored by Laron Faulkner working as scribe for Dr. Joshua Luis. I, Dr. Joshua Luis, have reviewed the content of this note and confirm it is true and accurate. I personally performed the history and physical examination and made all the decisions.\par 10/22/19

## 2019-10-23 NOTE — ASSESSMENT
[FreeTextEntry1] : 10/23/19: Mat Mauro is a 75 y/o male patient who presents today for an initial evaluation. He is a form patient of Dr. Castillo. He is s/p a suprapubic prostatectomy that was completed 2 years ago. He states that he is here for an overall evaluation today. Regarding his urination he states that it is overall satisfactory. Patient denies dysuria, gross hematuria, urgency or incontinence. Some hesitancy of the urinary stream can occur but it is minor. Some spraying occurs but he is more comfortable sitting down to urinate now. His last PSA was measured on 6/3/19 and was 6.7 ng/mL. Despite it being elevated it is reported to be stable. \par \par Digital rectal exam found no suspicious rectal masses. Anal tone is normal. The prostate is non tender with normal texture, discrete borders and no nodules. It is a 30 gram transurethral resection size. Prolapsed rectal mucosa. Hard to reach most superior portion of prostate. \par \par The patient produced a urine sample which will be sent for urinalysis, urine cytology and urine culture. \par Blood work today included comprehensive metabolic panel, CBC, PSA and testosterone. \par \par I will continue to monitor him for the presence of prostate cancer. I will consider ordering additional studies such as an MRI of the prostate as necessary. \par \par He is to follow up in 1 year or sooner if clinically indicated.

## 2019-10-23 NOTE — PHYSICAL EXAM
[General Appearance - Well Developed] : well developed [Normal Appearance] : normal appearance [Abdomen Soft] : soft [Abdomen Tenderness] : non-tender [Costovertebral Angle Tenderness] : no ~M costovertebral angle tenderness [Skin Color & Pigmentation] : normal skin color and pigmentation [Heart Rate And Rhythm] : Heart rate and rhythm were normal [] : no respiratory distress [Oriented To Time, Place, And Person] : oriented to person, place, and time [Affect] : the affect was normal [Normal Station and Gait] : the gait and station were normal for the patient's age [No Focal Deficits] : no focal deficits [No Palpable Adenopathy] : no palpable adenopathy [Cervical Lymph Nodes Enlarged Posterior Bilaterally] : posterior cervical [Cervical Lymph Nodes Enlarged Anterior Bilaterally] : anterior cervical [FreeTextEntry1] : no submandibular gland tenderness

## 2019-10-23 NOTE — HISTORY OF PRESENT ILLNESS
[FreeTextEntry1] : 10/23/19: Mat Mauro is a 73 y/o male patient who presents today for an initial evaluation. He is a form patient of Dr. Castillo. He is s/p a suprapubic prostatectomy that was completed 2 years ago. He states that he is here for an overall evaluation today. Regarding his urination he states that it is overall satisfactory. Patient denies dysuria, gross hematuria, urgency or incontinence. Some hesitancy of the urinary stream can occur but it is minor. Some spraying occurs but he is more comfortable sitting down to urinate now. His last PSA was measured on 6/3/19 and was 6.7 ng/mL. Despite it being elevated it is reported to be stable.

## 2019-10-30 ENCOUNTER — APPOINTMENT (OUTPATIENT)
Dept: UROLOGY | Facility: CLINIC | Age: 74
End: 2019-10-30

## 2019-11-10 LAB
APPEARANCE: CLEAR
BACTERIA UR CULT: NORMAL
BACTERIA: NEGATIVE
BILIRUBIN URINE: NEGATIVE
BLOOD URINE: NEGATIVE
COLOR: NORMAL
GLUCOSE QUALITATIVE U: NEGATIVE
HYALINE CASTS: 0 /LPF
KETONES URINE: NEGATIVE
LEUKOCYTE ESTERASE URINE: NEGATIVE
MICROSCOPIC-UA: NORMAL
NITRITE URINE: NEGATIVE
PH URINE: 7
PROTEIN URINE: NEGATIVE
PSA FREE FLD-MCNC: 21 %
PSA FREE SERPL-MCNC: 1.7 NG/ML
PSA SERPL-MCNC: 7.96 NG/ML
RED BLOOD CELLS URINE: 0 /HPF
SPECIFIC GRAVITY URINE: 1.01
SQUAMOUS EPITHELIAL CELLS: 0 /HPF
TESTOST SERPL-MCNC: 315 NG/DL
URINE CYTOLOGY: NORMAL
UROBILINOGEN URINE: NORMAL
WHITE BLOOD CELLS URINE: 0 /HPF

## 2019-12-04 ENCOUNTER — FORM ENCOUNTER (OUTPATIENT)
Age: 74
End: 2019-12-04

## 2019-12-05 ENCOUNTER — OUTPATIENT (OUTPATIENT)
Dept: OUTPATIENT SERVICES | Facility: HOSPITAL | Age: 74
LOS: 1 days | End: 2019-12-05
Payer: MEDICARE

## 2019-12-05 ENCOUNTER — APPOINTMENT (OUTPATIENT)
Dept: MRI IMAGING | Facility: IMAGING CENTER | Age: 74
End: 2019-12-05
Payer: MEDICARE

## 2019-12-05 DIAGNOSIS — R97.20 ELEVATED PROSTATE SPECIFIC ANTIGEN [PSA]: ICD-10-CM

## 2019-12-05 DIAGNOSIS — Z12.11 ENCOUNTER FOR SCREENING FOR MALIGNANT NEOPLASM OF COLON: Chronic | ICD-10-CM

## 2019-12-05 DIAGNOSIS — Z98.890 OTHER SPECIFIED POSTPROCEDURAL STATES: Chronic | ICD-10-CM

## 2019-12-05 PROCEDURE — 72197 MRI PELVIS W/O & W/DYE: CPT

## 2019-12-05 PROCEDURE — A9585: CPT

## 2019-12-05 PROCEDURE — 72197 MRI PELVIS W/O & W/DYE: CPT | Mod: 26

## 2019-12-17 ENCOUNTER — FORM ENCOUNTER (OUTPATIENT)
Age: 74
End: 2019-12-17

## 2020-07-09 ENCOUNTER — APPOINTMENT (OUTPATIENT)
Dept: UROLOGY | Facility: CLINIC | Age: 75
End: 2020-07-09
Payer: MEDICARE

## 2020-07-09 VITALS — TEMPERATURE: 97.7 F

## 2020-07-09 DIAGNOSIS — Z87.440 PERSONAL HISTORY OF URINARY (TRACT) INFECTIONS: ICD-10-CM

## 2020-07-09 PROCEDURE — 99214 OFFICE O/P EST MOD 30 MIN: CPT

## 2020-07-12 LAB
ALP BLD-CCNC: 81 U/L
ANION GAP SERPL CALC-SCNC: 14 MMOL/L
APPEARANCE: CLEAR
BILIRUBIN URINE: NEGATIVE
BLOOD URINE: NEGATIVE
BUN SERPL-MCNC: 23 MG/DL
CALCIUM SERPL-MCNC: 9.7 MG/DL
CHLORIDE SERPL-SCNC: 101 MMOL/L
CO2 SERPL-SCNC: 23 MMOL/L
COLOR: NORMAL
CREAT SERPL-MCNC: 1.45 MG/DL
GLUCOSE QUALITATIVE U: ABNORMAL
GLUCOSE SERPL-MCNC: 99 MG/DL
KETONES URINE: NEGATIVE
LEUKOCYTE ESTERASE URINE: NEGATIVE
NITRITE URINE: NEGATIVE
PH URINE: 6
POTASSIUM SERPL-SCNC: 5 MMOL/L
PROTEIN URINE: NEGATIVE
PSA FREE FLD-MCNC: 22 %
PSA FREE SERPL-MCNC: 3.54 NG/ML
PSA SERPL-MCNC: 16 NG/ML
SODIUM SERPL-SCNC: 139 MMOL/L
SPECIFIC GRAVITY URINE: 1.02
TESTOST SERPL-MCNC: 226 NG/DL
URINE CYTOLOGY: NORMAL
UROBILINOGEN URINE: NORMAL

## 2020-07-12 NOTE — ASSESSMENT
[FreeTextEntry1] : 10/23/19: Mat Mauro is a 75 y/o male patient who presents today for an initial evaluation. He is a form patient of Dr. Castillo. He is s/p a suprapubic prostatectomy that was completed 2 years ago. He states that he is here for an overall evaluation today. Regarding his urination he states that it is overall satisfactory. Patient denies dysuria, gross hematuria, urgency or incontinence. Some hesitancy of the urinary stream can occur but it is minor. Some spraying occurs but he is more comfortable sitting down to urinate now. His last PSA was measured on 6/3/19 and was 6.7 ng/mL. Despite it being elevated it is reported to be stable. Digital rectal exam found no suspicious rectal masses. Anal tone is normal. The prostate is non tender with normal texture, discrete borders and no nodules. It is a 30 gram transurethral resection size. Prolapsed rectal mucosa. Hard to reach most superior portion of prostate. The patient produced a urine sample which will be sent for urinalysis, urine cytology and urine culture. Blood work today included comprehensive metabolic panel, CBC, PSA and testosterone. I will continue to monitor him for the presence of prostate cancer. I will consider ordering additional studies such as an MRI of the prostate as necessary. He is to follow up in 1 year or sooner if clinically indicated. \par \par 07/09/2020: Patient presents today for a follow up. On 11/24/19, urine showed only no blood, normal growth, and was negative for high grade urothelial carcinoma. Testosterone was 315, and PSA was 7.96. On 06/13/2020, nitrates were found in the urine. PSA was 9.32. Hx of elevated PSA. Pt is on Sulfamethoxazole- Trimethoprim BID and has one day left. Hx of smoking, but has quit. Penicillin allergy. Hx of stroke. Urination is okay. Nocturia x1. No medication for urinary symptoms. Pt had some hesitancy today when giving a urine sample. PT is 280lbs.\par \par Digital rectal exam found no suspicious rectal mucosal masses. Small amount of prolapse rectal mucosa at 12 o clock. Anal tone is normal. The prostate is non tender, with normal texture, and discrete borders. There is a small nodule right mid lateral border of the prostate. No gross blood on exam finger. 50 TUR. \par \par Advised pt to lose weight. \par \par Pt will provide a urine sample today for cytology. \par \par Scheduled MRI for prostate nodule. Bloodwork on MRI date will include ALK MAHENDRA, BMP, PSA and testosterone.  RTO afterwards.

## 2020-07-12 NOTE — PHYSICAL EXAM
[General Appearance - Well Developed] : well developed [Normal Appearance] : normal appearance [Costovertebral Angle Tenderness] : no ~M costovertebral angle tenderness [Abdomen Soft] : soft [Abdomen Tenderness] : non-tender [Skin Color & Pigmentation] : normal skin color and pigmentation [Heart Rate And Rhythm] : Heart rate and rhythm were normal [] : no respiratory distress [Normal Station and Gait] : the gait and station were normal for the patient's age [Affect] : the affect was normal [No Focal Deficits] : no focal deficits [Oriented To Time, Place, And Person] : oriented to person, place, and time [Cervical Lymph Nodes Enlarged Posterior Bilaterally] : posterior cervical [No Palpable Adenopathy] : no palpable adenopathy [Cervical Lymph Nodes Enlarged Anterior Bilaterally] : anterior cervical [Prostate Size ___ gm] : prostate size [unfilled] gm [FreeTextEntry1] : no submandibular gland tenderness

## 2020-07-12 NOTE — ADDENDUM
[FreeTextEntry1] : This note was authored by Sonal Juan working as scribe for Dr. Joshua Luis. I, Dr. Joshua Luis, have reviewed the content of this note and confirm it is true and accurate. I personally performed the history and physical examination and made all the decisions.\par 07/09/2020

## 2020-07-12 NOTE — ASSESSMENT
[FreeTextEntry1] : 10/23/19: Mat Mauro is a 73 y/o male patient who presents today for an initial evaluation. He is a form patient of Dr. Castillo. He is s/p a suprapubic prostatectomy that was completed 2 years ago. He states that he is here for an overall evaluation today. Regarding his urination he states that it is overall satisfactory. Patient denies dysuria, gross hematuria, urgency or incontinence. Some hesitancy of the urinary stream can occur but it is minor. Some spraying occurs but he is more comfortable sitting down to urinate now. His last PSA was measured on 6/3/19 and was 6.7 ng/mL. Despite it being elevated it is reported to be stable. Digital rectal exam found no suspicious rectal masses. Anal tone is normal. The prostate is non tender with normal texture, discrete borders and no nodules. It is a 30 gram transurethral resection size. Prolapsed rectal mucosa. Hard to reach most superior portion of prostate. The patient produced a urine sample which will be sent for urinalysis, urine cytology and urine culture. Blood work today included comprehensive metabolic panel, CBC, PSA and testosterone. I will continue to monitor him for the presence of prostate cancer. I will consider ordering additional studies such as an MRI of the prostate as necessary. He is to follow up in 1 year or sooner if clinically indicated. \par \par 07/09/2020: Patient presents today for a follow up. On 11/24/19, urine showed only no blood, normal growth, and was negative for high grade urothelial carcinoma. Testosterone was 315, and PSA was 7.96. On 06/13/2020, nitrates were found in the urine. PSA was 9.32. Hx of elevated PSA. Pt is on Sulfamethoxazole- Trimethoprim BID and has one day left. Hx of smoking, but has quit. Penicillin allergy. Hx of stroke. Urination is okay. Nocturia x1. No medication for urinary symptoms. Pt had some hesitancy today when giving a urine sample. PT is 280lbs.\par \par Digital rectal exam found no suspicious rectal mucosal masses. Small amount of prolapse rectal mucosa at 12 o clock. Anal tone is normal. The prostate is non tender, with normal texture, and discrete borders. There is a small nodule right mid lateral border of the prostate. No gross blood on exam finger. 50 TUR. \par \par Advised pt to lose weight. \par \par Pt will provide a urine sample today for cytology. \par \par Scheduled MRI for prostate nodule. Bloodwork on MRI date will include ALK MAHENDRA, BMP, PSA and testosterone.  RTO afterwards.

## 2020-07-12 NOTE — HISTORY OF PRESENT ILLNESS
[FreeTextEntry1] : 10/23/19: Mat Mauro is a 73 y/o male patient who presents today for an initial evaluation. He is a form patient of Dr. Castillo. He is s/p a suprapubic prostatectomy that was completed 2 years ago. He states that he is here for an overall evaluation today. Regarding his urination he states that it is overall satisfactory. Patient denies dysuria, gross hematuria, urgency or incontinence. Some hesitancy of the urinary stream can occur but it is minor. Some spraying occurs but he is more comfortable sitting down to urinate now. His last PSA was measured on 6/3/19 and was 6.7 ng/mL. Despite it being elevated it is reported to be stable. \par \par 07/09/2020: Patient presents today for a follow up. On 11/24/19, urine showed only no blood, normal growth, and was negative for high grade urothelial carcinoma. Testosterone was 315, and PSA was 7.96. On 06/13/2020, nitrates were found in the urine. PSA was 9.32. Hx of elevated PSA. Pt is on Sulfamethoxazole- Trimethoprim BID and has one day left. Hx of smoking, but has quit. Penicillin allergy. Hx of stroke. Urination is okay. Nocturia x1. No medication for urinary symptoms. Pt had some hesitancy today when giving a urine sample. PT is 280lbs.\par \par

## 2020-07-12 NOTE — HISTORY OF PRESENT ILLNESS
[FreeTextEntry1] : 10/23/19: Mat Mauro is a 75 y/o male patient who presents today for an initial evaluation. He is a form patient of Dr. Castillo. He is s/p a suprapubic prostatectomy that was completed 2 years ago. He states that he is here for an overall evaluation today. Regarding his urination he states that it is overall satisfactory. Patient denies dysuria, gross hematuria, urgency or incontinence. Some hesitancy of the urinary stream can occur but it is minor. Some spraying occurs but he is more comfortable sitting down to urinate now. His last PSA was measured on 6/3/19 and was 6.7 ng/mL. Despite it being elevated it is reported to be stable. \par \par 07/09/2020: Patient presents today for a follow up. On 11/24/19, urine showed only no blood, normal growth, and was negative for high grade urothelial carcinoma. Testosterone was 315, and PSA was 7.96. On 06/13/2020, nitrates were found in the urine. PSA was 9.32. Hx of elevated PSA. Pt is on Sulfamethoxazole- Trimethoprim BID and has one day left. Hx of smoking, but has quit. Penicillin allergy. Hx of stroke. Urination is okay. Nocturia x1. No medication for urinary symptoms. Pt had some hesitancy today when giving a urine sample. PT is 280lbs.\par \par

## 2020-07-12 NOTE — PHYSICAL EXAM
[Normal Appearance] : normal appearance [General Appearance - Well Developed] : well developed [Abdomen Tenderness] : non-tender [Abdomen Soft] : soft [Costovertebral Angle Tenderness] : no ~M costovertebral angle tenderness [Skin Color & Pigmentation] : normal skin color and pigmentation [Heart Rate And Rhythm] : Heart rate and rhythm were normal [] : no respiratory distress [No Focal Deficits] : no focal deficits [Affect] : the affect was normal [Oriented To Time, Place, And Person] : oriented to person, place, and time [Normal Station and Gait] : the gait and station were normal for the patient's age [No Palpable Adenopathy] : no palpable adenopathy [Cervical Lymph Nodes Enlarged Posterior Bilaterally] : posterior cervical [Cervical Lymph Nodes Enlarged Anterior Bilaterally] : anterior cervical [Prostate Size ___ gm] : prostate size [unfilled] gm [FreeTextEntry1] : no submandibular gland tenderness

## 2020-08-25 ENCOUNTER — APPOINTMENT (OUTPATIENT)
Dept: MRI IMAGING | Facility: IMAGING CENTER | Age: 75
End: 2020-08-25
Payer: MEDICARE

## 2020-08-25 ENCOUNTER — OUTPATIENT (OUTPATIENT)
Dept: OUTPATIENT SERVICES | Facility: HOSPITAL | Age: 75
LOS: 1 days | End: 2020-08-25
Payer: MEDICARE

## 2020-08-25 DIAGNOSIS — Z98.890 OTHER SPECIFIED POSTPROCEDURAL STATES: Chronic | ICD-10-CM

## 2020-08-25 DIAGNOSIS — Z12.11 ENCOUNTER FOR SCREENING FOR MALIGNANT NEOPLASM OF COLON: Chronic | ICD-10-CM

## 2020-08-25 DIAGNOSIS — R97.20 ELEVATED PROSTATE SPECIFIC ANTIGEN [PSA]: ICD-10-CM

## 2020-08-25 PROCEDURE — A9585: CPT

## 2020-08-25 PROCEDURE — 72197 MRI PELVIS W/O & W/DYE: CPT | Mod: 26

## 2020-08-25 PROCEDURE — 72197 MRI PELVIS W/O & W/DYE: CPT

## 2020-09-03 ENCOUNTER — APPOINTMENT (OUTPATIENT)
Dept: UROLOGY | Facility: CLINIC | Age: 75
End: 2020-09-03
Payer: MEDICARE

## 2020-09-03 VITALS
HEIGHT: 73 IN | HEART RATE: 88 BPM | SYSTOLIC BLOOD PRESSURE: 115 MMHG | BODY MASS INDEX: 36.18 KG/M2 | WEIGHT: 273 LBS | RESPIRATION RATE: 18 BRPM | DIASTOLIC BLOOD PRESSURE: 69 MMHG

## 2020-09-03 VITALS — TEMPERATURE: 97.5 F

## 2020-09-03 DIAGNOSIS — Z97.8 PRESENCE OF OTHER SPECIFIED DEVICES: ICD-10-CM

## 2020-09-03 PROCEDURE — 99214 OFFICE O/P EST MOD 30 MIN: CPT

## 2020-09-05 PROBLEM — Z97.8 FOLEY CATHETER IN PLACE: Status: ACTIVE | Noted: 2017-07-13

## 2020-09-06 LAB
ALP BLD-CCNC: 78 U/L
ANION GAP SERPL CALC-SCNC: 13 MMOL/L
APPEARANCE: CLEAR
BASOPHILS # BLD AUTO: 0.02 K/UL
BASOPHILS NFR BLD AUTO: 0.3 %
BILIRUBIN URINE: NEGATIVE
BLOOD URINE: NEGATIVE
BUN SERPL-MCNC: 19 MG/DL
CALCIUM SERPL-MCNC: 9.5 MG/DL
CHLORIDE SERPL-SCNC: 104 MMOL/L
CO2 SERPL-SCNC: 23 MMOL/L
COLOR: COLORLESS
CREAT SERPL-MCNC: 1.21 MG/DL
EOSINOPHIL # BLD AUTO: 0.33 K/UL
EOSINOPHIL NFR BLD AUTO: 4.4 %
GLUCOSE QUALITATIVE U: ABNORMAL
GLUCOSE SERPL-MCNC: 139 MG/DL
HCT VFR BLD CALC: 47.7 %
HGB BLD-MCNC: 15.6 G/DL
IMM GRANULOCYTES NFR BLD AUTO: 0.5 %
KETONES URINE: NEGATIVE
LEUKOCYTE ESTERASE URINE: NEGATIVE
LYMPHOCYTES # BLD AUTO: 2.16 K/UL
LYMPHOCYTES NFR BLD AUTO: 28.9 %
MAN DIFF?: NORMAL
MCHC RBC-ENTMCNC: 30.4 PG
MCHC RBC-ENTMCNC: 32.7 GM/DL
MCV RBC AUTO: 93 FL
MONOCYTES # BLD AUTO: 0.63 K/UL
MONOCYTES NFR BLD AUTO: 8.4 %
NEUTROPHILS # BLD AUTO: 4.29 K/UL
NEUTROPHILS NFR BLD AUTO: 57.5 %
NITRITE URINE: NEGATIVE
PH URINE: 6.5
PLATELET # BLD AUTO: 242 K/UL
POTASSIUM SERPL-SCNC: 4.5 MMOL/L
PROTEIN URINE: NEGATIVE
PSA FREE FLD-MCNC: 20 %
PSA FREE SERPL-MCNC: 1.62 NG/ML
PSA SERPL-MCNC: 7.93 NG/ML
RBC # BLD: 5.13 M/UL
RBC # FLD: 13.2 %
SODIUM SERPL-SCNC: 140 MMOL/L
SPECIFIC GRAVITY URINE: 1.01
UROBILINOGEN URINE: NORMAL
WBC # FLD AUTO: 7.47 K/UL

## 2020-09-08 LAB — URINE CYTOLOGY: NORMAL

## 2020-09-13 LAB
TESTOST BND SERPL-MCNC: 11 PG/ML
TESTOST SERPL-MCNC: 248.7 NG/DL

## 2020-09-13 NOTE — PHYSICAL EXAM
[General Appearance - Well Developed] : well developed [Normal Appearance] : normal appearance [General Appearance - In No Acute Distress] : no acute distress [] : no respiratory distress [Skin Color & Pigmentation] : normal skin color and pigmentation [Heart Rate And Rhythm] : Heart rate and rhythm were normal [Affect] : the affect was normal [Oriented To Time, Place, And Person] : oriented to person, place, and time [Not Anxious] : not anxious [No Focal Deficits] : no focal deficits [Mood] : the mood was normal [Prostate Size ___ gm] : prostate size [unfilled] gm [Abdomen Soft] : soft [Abdomen Tenderness] : non-tender [Edema] : no peripheral edema [FreeTextEntry1] : Digital rectal exam was performed. Prolpased rectal mucosa. Normal pink. Scrotal skin normal. Testes b/l descended. Rectal mucosa is from left side of rectum. Prostate has small nodule on right lateral border between apex of base. Top of base can't reach but prostate muscle is felt. 35g transurethral resection size. Prostate nontender. No rectal mucosal lesions. No gross blood on examining fingers.

## 2020-09-13 NOTE — ADDENDUM
[FreeTextEntry1] : I, Lucinda Tsin, acted solely as a scribe for Dr. Joshua Luis on this date 09/03/2020.\par \par All medical record entries made by the Scribe were at my, Dr. Joshua Luis, direction and personally dictated by me on 09/03/2020. I have reviewed the chart and agree that the record accurately reflects my personal performance of the history, physical exam, assessment and plan.  I have also personally directed, reviewed and agreed with the chart.

## 2020-09-13 NOTE — HISTORY OF PRESENT ILLNESS
[FreeTextEntry1] : 9/3/2020: Mr. Mauro presents today for follow up for MRI result from August 25th shows Enlarged gland at 1 35 cc volume with mass effect on the bladder.No MRI suspicious lesions noted PIRADS 2 - Clinically significant cancer is unlikely to be present. On 7/9/2020, lab results show PSA of 16 and free PSA of 22%. Pt notes his urine stream is fine. Pt is diabetic takes MetFormin and Jardiance.

## 2020-09-13 NOTE — ASSESSMENT
[FreeTextEntry1] : 9/3/2020: Mr. Mauro presents today for follow up for MRI result from August 25th shows Enlarged gland at 1 35 cc volume with mass effect on the bladder.No MRI suspicious lesions noted PIRADS 2 - Clinically significant cancer is unlikely to be present. On 7/9/2020, lab results show PSA of 16 and free PSA of 22%. Pt notes his urine stream is fine. Pt is diabetic takes MetFormin and Jardiance.\par \par Digital rectal exam was performed. Prolapsed rectal mucosa. Normal pink. Scrotal skin normal. Testes b/l descended. Rectal mucosa is from left side of rectum. Prostate has small nodule on right lateral border between apex of base. Top of base could not be reached but prostate felt as if it might be 35g transurethral resection size. Prostate nontender. No rectal mucosal lesions. No gross blood on examining fingers. \par \par The patient produced a urine sample which will be sent for urinalysis, urine cytology, and urine culture. \par Blood work today included PSA, CMP, CBC, and testosterone.\par \par Patient will return to the office in 6 months.

## 2020-12-23 PROBLEM — Z87.440 HISTORY OF URINARY TRACT INFECTION: Status: RESOLVED | Noted: 2017-09-19 | Resolved: 2020-12-23

## 2020-12-28 ENCOUNTER — NON-APPOINTMENT (OUTPATIENT)
Age: 75
End: 2020-12-28

## 2021-04-19 NOTE — PROGRESS NOTE ADULT - NSHPATTENDINGPLANDISCUSS_GEN_ALL_CORE
Urology/patient
17-Apr-2021 23:32
pt and gu team
Urology/patient

## 2021-05-20 ENCOUNTER — APPOINTMENT (OUTPATIENT)
Dept: UROLOGY | Facility: CLINIC | Age: 76
End: 2021-05-20
Payer: MEDICARE

## 2021-05-20 VITALS
TEMPERATURE: 97.9 F | HEART RATE: 74 BPM | DIASTOLIC BLOOD PRESSURE: 70 MMHG | BODY MASS INDEX: 36.18 KG/M2 | RESPIRATION RATE: 17 BRPM | SYSTOLIC BLOOD PRESSURE: 117 MMHG | HEIGHT: 73 IN | WEIGHT: 273 LBS

## 2021-05-20 DIAGNOSIS — K42.9 UMBILICAL HERNIA W/OUT OBSTRUCTION OR GANGRENE: ICD-10-CM

## 2021-05-20 DIAGNOSIS — M89.9 DISORDER OF BONE, UNSPECIFIED: ICD-10-CM

## 2021-05-20 DIAGNOSIS — R73.03 PREDIABETES.: ICD-10-CM

## 2021-05-20 DIAGNOSIS — G47.33 OBSTRUCTIVE SLEEP APNEA (ADULT) (PEDIATRIC): ICD-10-CM

## 2021-05-20 DIAGNOSIS — N40.0 BENIGN PROSTATIC HYPERPLASIA WITHOUT LOWER URINARY TRACT SYMPMS: ICD-10-CM

## 2021-05-20 DIAGNOSIS — N40.2 NODULAR PROSTATE W/OUT LOWER URINARY TRACT SYMPTOMS: ICD-10-CM

## 2021-05-20 DIAGNOSIS — N52.1 ERECTILE DYSFUNCTION DUE TO DISEASES CLASSIFIED ELSEWHERE: ICD-10-CM

## 2021-05-20 PROCEDURE — 99214 OFFICE O/P EST MOD 30 MIN: CPT

## 2021-05-20 RX ORDER — TAMSULOSIN HYDROCHLORIDE 0.4 MG/1
0.4 CAPSULE ORAL
Qty: 90 | Refills: 3 | Status: COMPLETED | COMMUNITY
Start: 2017-10-19 | End: 2021-05-20

## 2021-05-22 PROBLEM — N40.2 PROSTATE NODULE: Status: ACTIVE | Noted: 2020-07-09

## 2021-05-22 PROBLEM — K42.9 UMBILICAL HERNIA: Status: ACTIVE | Noted: 2017-08-07

## 2021-05-22 PROBLEM — M89.9 BONE LESION: Status: ACTIVE | Noted: 2017-10-19

## 2021-05-22 PROBLEM — N52.1 MALE ERECTILE DISORDER DUE TO PHYSICAL CONDITION: Status: ACTIVE | Noted: 2019-10-23

## 2021-05-22 PROBLEM — N40.0 ENLARGED PROSTATE: Status: RESOLVED | Noted: 2017-07-13 | Resolved: 2021-05-22

## 2021-05-22 LAB
APPEARANCE: CLEAR
BACTERIA: NEGATIVE
BILIRUBIN URINE: NEGATIVE
BLOOD URINE: NEGATIVE
COLOR: NORMAL
GLUCOSE QUALITATIVE U: ABNORMAL
HYALINE CASTS: 0 /LPF
KETONES URINE: NEGATIVE
LEUKOCYTE ESTERASE URINE: NEGATIVE
MICROSCOPIC-UA: NORMAL
NITRITE URINE: NEGATIVE
PH URINE: 6
PROTEIN URINE: NEGATIVE
RED BLOOD CELLS URINE: 0 /HPF
SPECIFIC GRAVITY URINE: 1.02
SQUAMOUS EPITHELIAL CELLS: 0 /HPF
URINE CYTOLOGY: NORMAL
UROBILINOGEN URINE: NORMAL
WHITE BLOOD CELLS URINE: 0 /HPF

## 2021-05-22 NOTE — ASSESSMENT
[FreeTextEntry1] : 10/23/19: Mat Mauro is a 75 y/o male patient who presents today for an initial evaluation. He is a form patient of Dr. Castillo. He is s/p a suprapubic prostatectomy that was completed 2 years ago. He states that he is here for an overall evaluation today. Regarding his urination he states that it is overall satisfactory. Patient denies dysuria, gross hematuria, urgency or incontinence. Some hesitancy of the urinary stream can occur but it is minor. Some spraying occurs but he is more comfortable sitting down to urinate now. His last PSA was measured on 6/3/19 and was 6.7 ng/mL. Despite it being elevated it is reported to be stable. \par \par 07/09/2020: Patient presents today for a follow up. On 11/24/19, urine showed only no blood, normal growth, and was negative for high grade urothelial carcinoma. Testosterone was 315, and PSA was 7.96. On 06/13/2020, nitrates were found in the urine. PSA was 9.32. Hx of elevated PSA. Pt is on Sulfamethoxazole- Trimethoprim BID and has one day left. Hx of smoking, but has quit. Penicillin allergy. Hx of stroke. Urination is okay. Nocturia x1. No medication for urinary symptoms. Pt had some hesitancy today when giving a urine sample. PT is 280lbs.\par \par 9/3/2020: Mr. Mauro presents today for follow up for MRI result from August 25th shows Enlarged gland at 1 35 cc volume with mass effect on the bladder.No MRI suspicious lesions noted PIRADS 2 - Clinically significant cancer is unlikely to be present. On 7/9/2020, lab results show PSA of 16 and free PSA of 22%. Pt notes his urine stream is fine. Pt is diabetic takes MetFormin and Jardiance.\par \par 05/20/2021: Patient presents today for follow up. Provided blood work from 5/4/21 which showed elevated PSA 0.12 although slightly lower than previous. % free PSA was 19.7%. Absolute amount of free PSA was 1.8 ng/mL. On 4/22/21, HbA1c was 5.9. Hx of DM and takes Metformin and Jardiance which is causing glucosuria. Creatinine was 1.18, alk phos 84. Hx of prostate nodule and had MRI 8/25/20 with no suspicious lesions noted and PIRADS 2. Wakes 1x at night to urinate. Sometimes has urinary urgency and rarely leaks. Denies pushing or straining, but states he sometimes has hesitancy. Controls constipation with prunes. Notes he stopped Tamsulosin one year ago without any issues. \par \par Digital rectal exam found no suspicious rectal masses. No rectal mucosal lesions. Anal tone is normal. The prostate is non tender, with normal texture, discrete borders. There is a smooth nodule at the right lateral base superiorly, firmer than surrounding tissue. It is a 45 gram transurethral resection size. No gross blood on examining fingers. \par \par RTO in 6 months for follow up or sooner if new urinary symptoms develop. \par \par Preparation, in-person office visit, and coordination of care took 30  minutes

## 2021-05-22 NOTE — HISTORY OF PRESENT ILLNESS
[FreeTextEntry1] : 10/23/19: Mat Mauro is a 75 y/o male patient who presents today for an initial evaluation. He is a form patient of Dr. Castillo. He is s/p a suprapubic prostatectomy that was completed 2 years ago. He states that he is here for an overall evaluation today. Regarding his urination he states that it is overall satisfactory. Patient denies dysuria, gross hematuria, urgency or incontinence. Some hesitancy of the urinary stream can occur but it is minor. Some spraying occurs but he is more comfortable sitting down to urinate now. His last PSA was measured on 6/3/19 and was 6.7 ng/mL. Despite it being elevated it is reported to be stable. \par \par 07/09/2020: Patient presents today for a follow up. On 11/24/19, urine showed only no blood, normal growth, and was negative for high grade urothelial carcinoma. Testosterone was 315, and PSA was 7.96. On 06/13/2020, nitrates were found in the urine. PSA was 9.32. Hx of elevated PSA. Pt is on Sulfamethoxazole- Trimethoprim BID and has one day left. Hx of smoking, but has quit. Penicillin allergy. Hx of stroke. Urination is okay. Nocturia x1. No medication for urinary symptoms. Pt had some hesitancy today when giving a urine sample. PT is 280lbs.\par \par 9/3/2020: Mr. Mauro presents today for follow up for MRI result from August 25th shows Enlarged gland at 1 35 cc volume with mass effect on the bladder.No MRI suspicious lesions noted PIRADS 2 - Clinically significant cancer is unlikely to be present. On 7/9/2020, lab results show PSA of 16 and free PSA of 22%. Pt notes his urine stream is fine. Pt is diabetic takes MetFormin and Jardiance.\par \par 05/20/2021: Patient presents today for follow up. Provided blood work from 5/4/21 which showed elevated PSA 0.12 although slightly lower than previous. % free PSA was 19.7%. Absolute amount of free PSA was 1.8 ng/mL. On 4/22/21, HbA1c was 5.9. Hx of DM and takes Metformin and Jardiance which is causing glucosuria. Creatinine was 1.18, alk phos 84. Hx of prostate nodule and had MRI 8/25/20 with no suspicious lesions noted and PIRADS 2. Wakes 1x at night to urinate. Sometimes has urinary urgency and rarely leaks. Denies pushing or straining, but states he sometimes has hesitancy. Controls constipation with prunes. Notes he stopped Tamsulosin one year ago without any issues.

## 2021-05-22 NOTE — ADDENDUM
[FreeTextEntry1] : I, Lucinda Lomasin, acted solely as a scribe for Dr. Joshua Luis on this date 05/20/2021.\par \par All medical record entries made by the Scribe were at my, Dr. Joshua Luis, direction and personally dictated by me on 05/20/2021. I have reviewed the chart and agree that the record accurately reflects my personal performance of the history, physical exam, assessment and plan.  I have also personally directed, reviewed and agreed with the chart.

## 2021-05-22 NOTE — PHYSICAL EXAM
[General Appearance - Well Developed] : well developed [Normal Appearance] : normal appearance [General Appearance - In No Acute Distress] : no acute distress [Abdomen Soft] : soft [Abdomen Tenderness] : non-tender [Skin Color & Pigmentation] : normal skin color and pigmentation [Heart Rate And Rhythm] : Heart rate and rhythm were normal [Edema] : no peripheral edema [] : no respiratory distress [Oriented To Time, Place, And Person] : oriented to person, place, and time [Affect] : the affect was normal [Mood] : the mood was normal [Not Anxious] : not anxious [No Focal Deficits] : no focal deficits [FreeTextEntry1] : Digital rectal exam found no suspicious rectal masses. No rectal mucosal lesions. Anal tone is normal. The prostate is non tender, with normal texture, discrete borders. There is a smooth nodule at the right lateral base superiorly, firmer than surrounding tissue. It is a 45 gram transurethral resection size. No gross blood on examining fingers.

## 2021-10-13 ENCOUNTER — APPOINTMENT (OUTPATIENT)
Dept: UROLOGY | Facility: CLINIC | Age: 76
End: 2021-10-13
Payer: MEDICARE

## 2021-10-13 DIAGNOSIS — R31.0 GROSS HEMATURIA: ICD-10-CM

## 2021-10-13 PROCEDURE — 99213 OFFICE O/P EST LOW 20 MIN: CPT

## 2021-10-14 LAB
APPEARANCE: CLEAR
BACTERIA: NEGATIVE
BILIRUBIN URINE: NEGATIVE
BLOOD URINE: NEGATIVE
COLOR: NORMAL
GLUCOSE QUALITATIVE U: ABNORMAL
HYALINE CASTS: 0 /LPF
KETONES URINE: NEGATIVE
LEUKOCYTE ESTERASE URINE: NEGATIVE
MICROSCOPIC-UA: NORMAL
NITRITE URINE: NEGATIVE
PH URINE: 6
PROTEIN URINE: NEGATIVE
RED BLOOD CELLS URINE: 0 /HPF
SPECIFIC GRAVITY URINE: 1.01
SQUAMOUS EPITHELIAL CELLS: 0 /HPF
UROBILINOGEN URINE: NORMAL
WHITE BLOOD CELLS URINE: 0 /HPF

## 2021-10-19 PROBLEM — R31.0 HEMATURIA, GROSS: Status: ACTIVE | Noted: 2021-10-19

## 2021-10-19 NOTE — ASSESSMENT
[FreeTextEntry1] : BPH s/p SPP. has residual prostate tissues but no bothersome luts. PSAd remains low and no suspicious lesions. Has some incomplete emptying but no sequelae. \par --Double void\par --RTC in 1y\par \par ? hematuria. Does not sounds like it by hx. \par --UA< UCX, cyto

## 2021-10-19 NOTE — PHYSICAL EXAM
[Abdomen Soft] : soft [Abdomen Tenderness] : non-tender [Costovertebral Angle Tenderness] : no ~M costovertebral angle tenderness [Exaggerated Use Of Accessory Muscles For Inspiration] : no accessory muscle use [Oriented To Time, Place, And Person] : oriented to person, place, and time [Normal Station and Gait] : the gait and station were normal for the patient's age [FreeTextEntry1] : [id number] (1h post void)

## 2021-10-19 NOTE — HISTORY OF PRESENT ILLNESS
[FreeTextEntry1] : 75yo gentleman with cc BPH. Pt previously seen by Dr Castillo and then Dr Luis. Has hx of RAL SPP 10/2017 with path showing 139gm benign tissue. he had great improvement in his sx following this with good stream and no real LUTS. He was previously on CIC. Now at baseline goes 8-10 times per day and gets up 2x per night. Occasional hesitancy. great stream. No dribbling. Pre-tx PSA was 11 with hx of negative bx. \par \par He has had variable PSA since then. Most recent PSA was 9.2 in May 2021. PSA was up to 16 in 9/2020. Prostate (post surgery) is 135gm with no suspicious lesions on MRI from 8/2020. \par \par he thought he may have seen blood last month but is unsure if it was urine or rectal. \par 20 pack year hx, quit in 1990.

## 2021-10-21 LAB — URINE CYTOLOGY: NORMAL

## 2021-11-18 ENCOUNTER — APPOINTMENT (OUTPATIENT)
Dept: UROLOGY | Facility: CLINIC | Age: 76
End: 2021-11-18

## 2021-11-19 ENCOUNTER — APPOINTMENT (OUTPATIENT)
Dept: UROLOGY | Facility: CLINIC | Age: 76
End: 2021-11-19

## 2021-12-05 LAB — BACTERIA UR CULT: NORMAL

## 2022-03-28 NOTE — ED PROVIDER NOTE - SKIN NEGATIVE STATEMENT, MLM
Pt has been notified of lab results   no abrasions, no jaundice, no lesions, no pruritis, and no rashes.

## 2022-08-24 NOTE — ASU PATIENT PROFILE, ADULT - TEACHING/LEARNING RELIGIOUS CONSIDERATIONS
Addended by: Kristie Smith on: 8/24/2022 03:39 PM     Modules accepted: Orders Yarsani/Tem[employer] considerations

## 2022-08-25 ENCOUNTER — APPOINTMENT (OUTPATIENT)
Dept: PULMONOLOGY | Facility: CLINIC | Age: 77
End: 2022-08-25

## 2022-08-26 DIAGNOSIS — L02.612 CUTANEOUS ABSCESS OF LEFT FOOT: ICD-10-CM

## 2022-08-26 DIAGNOSIS — B35.1 TINEA UNGUIUM: ICD-10-CM

## 2022-08-26 DIAGNOSIS — E11.9 TYPE 2 DIABETES MELLITUS W/OUT COMPLICATIONS: ICD-10-CM

## 2022-08-26 DIAGNOSIS — L60.2 ONYCHOGRYPHOSIS: ICD-10-CM

## 2022-08-26 DIAGNOSIS — E78.00 PURE HYPERCHOLESTEROLEMIA, UNSPECIFIED: ICD-10-CM

## 2022-08-31 ENCOUNTER — APPOINTMENT (OUTPATIENT)
Dept: PODIATRY | Facility: CLINIC | Age: 77
End: 2022-08-31

## 2022-08-31 DIAGNOSIS — B35.3 TINEA PEDIS: ICD-10-CM

## 2022-08-31 DIAGNOSIS — L60.1 ONYCHOLYSIS: ICD-10-CM

## 2022-08-31 PROCEDURE — 99212 OFFICE O/P EST SF 10 MIN: CPT | Mod: 25

## 2022-08-31 PROCEDURE — 11721 DEBRIDE NAIL 6 OR MORE: CPT

## 2022-09-09 PROBLEM — B35.3 TINEA PEDIS, UNSPECIFIED LATERALITY: Status: ACTIVE | Noted: 2022-09-07

## 2022-09-09 PROBLEM — L60.1 ONYCHOLYSIS: Status: ACTIVE | Noted: 2022-09-07

## 2022-09-09 NOTE — HISTORY OF PRESENT ILLNESS
[Sneakers] : thom [FreeTextEntry1] : Patient present today for painful onychomycotic toenails. Patient is diabetic. He does not measure his finger stick. A1c is 6.1. His last primary care visit was in August. Patient has bilateral toe numbness that he has had for several years. Past medical history has been verified. Patient has itching at the bottoms of the feet. He has not tried any interventions for it.

## 2022-09-09 NOTE — ASSESSMENT
[FreeTextEntry1] : \par Treatment: For tinea pedis, Lotrimin cream has been sent to patient's pharmacy. For onychomycosis, nails x10 were debrided in thickness and in length with sterile nippers and rotary bur to patient s satisfaction. Patient tolerated the procedure well. For onycholysis, discussed with patient that his nail plate is detaching from the nail bed, however the patient simeon snot want his nails to be trimmed too proximally. I advised patient to be careful when putting on socks and shoes as to not avulse the nail himself. \par Patient was educated on the importance of glycemic control and diabetic foot care and neuropathic precautions. Patient's is wearing dress type shoes with signs of excessive wear. Patient was given a script for diabetic orthopedic shoes and information for local venders. Will see patient back in 3 months.

## 2022-09-09 NOTE — PHYSICAL EXAM
[2+] : left foot dorsalis pedis 2+ [Vibration Dec.] : diminished vibratory sensation at the level of the toes [Diminished Throughout Right Foot] : diminished sensation with monofilament testing throughout right foot [Diminished Throughout Left Foot] : diminished sensation with monofilament testing throughout left foot [FreeTextEntry3] : CFT: Immediate. Temperature gradient is normal. Pedal hair is absent. There is red discoloration of the digits 1 through 5 bilaterally.  [de-identified] : Adductovarus of the 5th digits bilaterally.  [FreeTextEntry1] : Thickened, elongated, dystrophic, thickened to 3mm, yellowing toenails 1 through 5. Bilateral hallucal nails onycholysis from the nail bed. They are attached at the proximal nail border. No clinical signs of infection. No open lesions. Interspaces are clean and intact. There is dry scaling plantarly bilaterally.

## 2022-09-29 ENCOUNTER — APPOINTMENT (OUTPATIENT)
Dept: PULMONOLOGY | Facility: CLINIC | Age: 77
End: 2022-09-29

## 2022-09-29 VITALS
HEIGHT: 73 IN | SYSTOLIC BLOOD PRESSURE: 136 MMHG | RESPIRATION RATE: 15 BRPM | TEMPERATURE: 97.9 F | BODY MASS INDEX: 37.11 KG/M2 | DIASTOLIC BLOOD PRESSURE: 75 MMHG | WEIGHT: 280 LBS | HEART RATE: 89 BPM | OXYGEN SATURATION: 95 %

## 2022-09-29 PROCEDURE — 99204 OFFICE O/P NEW MOD 45 MIN: CPT

## 2022-09-29 NOTE — PHYSICAL EXAM
[No Acute Distress] : no acute distress [Well Nourished] : well nourished [No Deformities] : no deformities [Enlarged Base of the Tongue] : enlarged base of the tongue [III] : Mallampati Class: III [Normal Appearance] : normal appearance [No Neck Mass] : no neck mass [Normal Rate/Rhythm] : normal rate/rhythm [Normal S1, S2] : normal s1, s2 [No Murmurs] : no murmurs [No Resp Distress] : no resp distress [No Acc Muscle Use] : no acc muscle use [Clear to Auscultation Bilaterally] : clear to auscultation bilaterally [No Clubbing] : no clubbing [No Cyanosis] : no cyanosis [3+ Pitting] : 3+ pitting [No Focal Deficits] : no focal deficits [Oriented x3] : oriented x3 [Normal Mood] : normal mood [Normal Affect] : normal affect

## 2022-09-29 NOTE — ASSESSMENT
[FreeTextEntry1] : This is a 77-year-old male with significant past medical history of obesity and obstructive sleep apnea who comes in to reestablish care with me.\par \par #Obstructive sleep apnea–patient has a known history of moderate obstructive sleep apnea diagnosed as far back as 1995.  He has subsequently used his APAP machine during that time.  Indicates that he continues to feel benefit from it.  Given that his current machine is beginning to falter and receiving warning signs he should not receive the machine as quickly as possible.  We will send out a prescription to a durable medical company as his last company was [employer].  They prefer a DME company that is more local.  Patient is continuing to derive benefit and should continue to use APAP device.\par \par Compliance report from his machine was obtained in today's session and showed a 90-day compliance of 100%.  His average use (days used) is 7 hours and 14 minutes.  He is currently on an APAP setting of 9-20 cm H2O.  His AHI is 2.2/h.

## 2022-10-21 ENCOUNTER — APPOINTMENT (OUTPATIENT)
Dept: UROLOGY | Facility: CLINIC | Age: 77
End: 2022-10-21

## 2022-10-21 VITALS
TEMPERATURE: 97.5 F | SYSTOLIC BLOOD PRESSURE: 134 MMHG | DIASTOLIC BLOOD PRESSURE: 75 MMHG | RESPIRATION RATE: 16 BRPM | OXYGEN SATURATION: 97 % | HEART RATE: 78 BPM

## 2022-10-21 DIAGNOSIS — N40.0 BENIGN PROSTATIC HYPERPLASIA WITHOUT LOWER URINARY TRACT SYMPMS: ICD-10-CM

## 2022-10-21 DIAGNOSIS — R97.20 ELEVATED PROSTATE, SPECIFIC ANTIGEN [PSA]: ICD-10-CM

## 2022-10-21 PROCEDURE — 99213 OFFICE O/P EST LOW 20 MIN: CPT

## 2022-10-21 RX ORDER — EMPAGLIFLOZIN 10 MG/1
10 TABLET, FILM COATED ORAL
Refills: 0 | Status: DISCONTINUED | COMMUNITY
End: 2022-10-21

## 2022-10-21 RX ORDER — TAMSULOSIN HYDROCHLORIDE 0.4 MG/1
0.4 CAPSULE ORAL
Refills: 0 | Status: DISCONTINUED | COMMUNITY
End: 2022-10-21

## 2022-10-21 RX ORDER — CLOTRIMAZOLE AND BETAMETHASONE DIPROPIONATE 10; .5 MG/G; MG/G
1-0.05 CREAM TOPICAL TWICE DAILY
Qty: 1 | Refills: 3 | Status: DISCONTINUED | COMMUNITY
Start: 2022-08-31 | End: 2022-10-21

## 2022-10-21 NOTE — ASSESSMENT
[FreeTextEntry1] : BPH s/p SPP. has residual prostate tissue but no bothersome luts. PSAd remains low and no suspicious lesions. Beyond screening age. Has some incomplete emptying but no sequelae. \par --Double void\par --RTC in 1y\par

## 2022-10-21 NOTE — PHYSICAL EXAM
[Abdomen Soft] : soft [Abdomen Tenderness] : non-tender [Costovertebral Angle Tenderness] : no ~M costovertebral angle tenderness [Exaggerated Use Of Accessory Muscles For Inspiration] : no accessory muscle use [Oriented To Time, Place, And Person] : oriented to person, place, and time [Normal Station and Gait] : the gait and station were normal for the patient's age [General Appearance - Well Developed] : well developed [General Appearance - Well Nourished] : well nourished [General Appearance - In No Acute Distress] : no acute distress [FreeTextEntry1] : able to palpate only apex without lesions

## 2022-10-21 NOTE — HISTORY OF PRESENT ILLNESS
[FreeTextEntry1] : 76yo gentleman with cc BPH. Pt previously seen by Dr Castillo and then Dr Luis. Has hx of RAL SPP 10/2017 with path showing 139gm benign tissue. he had great improvement in his sx following this with good stream and no real LUTS. He was previously on CIC. Now at baseline goes 8-10 times per day and gets up 2x per night. Occasional hesitancy. great stream. No dribbling. Pre-tx PSA was 11 with hx of negative bx. \par \par He has had variable PSA since then. Most recent PSA was 9.2 in May 2021. PSA was up to 16 in 9/2020. Prostate (post surgery) is 135gm with no suspicious lesions on MRI from 8/2020. No recent PSA. No family hx of prostate ca. \par \par he thought he may have seen blood last month but is unsure if it was urine or rectal. 20 pack year hx, quit in 1990.

## 2022-10-24 PROBLEM — N40.0 BPH (BENIGN PROSTATIC HYPERPLASIA): Status: ACTIVE | Noted: 2017-07-13

## 2022-10-24 PROBLEM — R97.20 ELEVATED PSA: Status: ACTIVE | Noted: 2019-10-23

## 2022-12-13 ENCOUNTER — APPOINTMENT (OUTPATIENT)
Dept: PODIATRY | Facility: CLINIC | Age: 77
End: 2022-12-13

## 2022-12-13 DIAGNOSIS — M79.674 PAIN IN RIGHT TOE(S): ICD-10-CM

## 2022-12-13 DIAGNOSIS — M79.675 PAIN IN LEFT TOE(S): ICD-10-CM

## 2022-12-13 PROCEDURE — 11721 DEBRIDE NAIL 6 OR MORE: CPT | Mod: 59

## 2022-12-13 PROCEDURE — 11055 PARING/CUTG B9 HYPRKER LES 1: CPT

## 2022-12-14 VITALS — HEIGHT: 73 IN | WEIGHT: 281 LBS | BODY MASS INDEX: 37.24 KG/M2

## 2022-12-14 PROBLEM — M79.675 PAIN OF TOE OF LEFT FOOT: Status: ACTIVE | Noted: 2022-08-26

## 2022-12-14 PROBLEM — M79.674 PAIN OF TOE OF RIGHT FOOT: Status: ACTIVE | Noted: 2022-08-26

## 2022-12-15 NOTE — PROGRESS NOTE ADULT - PROBLEM SELECTOR PROBLEM 8
Chief Complaint   Patient presents with   • Foot     LEFT   • Office Visit     Date of Injury: 10/01/2022  Initial Treatment Date: 2022  Date Last Seen: 2022  Mechanism of Onset: idiopathic onset    Occupation: assistnt director of facilities   Referred by: Michelle Rosenberg MD  Primary Care Physician: Bertram Gil MD  Date informed consent signed: 2022  I have reviewed the past medical history, family history, social history, medications and allergies listed in the medical record as obtained by my nursing staff and support staff and agree with their documentation.  Jacqui  reports that she quit smoking about 8 years ago. She has never used smokeless tobacco.  Jacqui has No Known Allergies.  Visit Number of Current Episode: 2  Initial Pain Ratin/10  Initial PROM 57.5 % Lower Extremity Functional Scale (46/80)    COVID-19 Screening:    • Does the patient OR patient’s household members have any of the following symptoms?  o Temperature: Fever ?100.0°F or ?37.8°C?  No  o Respiratory symptoms: New or worsening cough, shortness of breath, or sore throat? No  o GI symptoms: New onset of nausea, vomiting or diarrhea?  No  o Miscellaneous: New onset of loss of taste or smell, chills, repeated shaking with chills, muscle pain or headache?  No  • Has the patient or a household member tested positive for COVID-19 in the last 14 days?  No  • Has the patient or a household member been tested for COVID-19 and are waiting for the results?  No  •   SUBJECTIVE:  The patient is a pleasant 56 year old female that presents to our office today for an evaluation and treatment of left foot. Patient rates her pain today at 2-3/10 with 10 being worst.   Jacqui stated her left foot feels better after her first appointment. She stated the dry needling helped. Her foot pain is no longer a constant sharp pain. Jacqui described the foot pain as  a throbbing pain, but its more tolerable . When transitioning from sitting  to standing she doesn't feel a sharp jolt anymore. She is also able to walk for prolonged periods of time, again ,only feeling a throbbing pain  Jacqui feels she can now get a pedicure.   Jacqui has been able to get a good nights rest.      Jacqui has a secondary complaint of left arm pain. She stated her left arm and hand goes numb and feels a pulling sensation. When her hand goes numb, she feels she would drop anything she was holding with the left hand.    Relevant Diagnostic Imaging:   EXAM: XR FOOT 3+ VW LEFT     HISTORY: Pain in left foot     COMPARISONS:  None.     TECHNIQUE: 3 views of the left foot, nonweightbearing     FINDINGS:       There is no evidence of acute fracture nor dislocation. Osseous alignment is normal. There are marginal osteophytes at the first metatarsal phalangeal joint. Enthesopathy at the base of the fifth proximal phalanx.  Irregular calcaneal spurs at the origin of the plantar fascia and insertion of the Achilles tendon.     If clinical suspicion exists for TMT joint injury, repeat examination while  weightbearing would be useful to exclude underlying ligamentous  instability/injury.     IMPRESSION:   1. Mild degenerative changes.  2. Calcaneal spurs.    I have personally reviewed the XR report from 11/29/22 on her left foot, and my findings are in agreement with those found by the radiologist.    OBJECTIVE FINDINGS:   Patient Emotional screening was completed 12/12/2024; DoD/VA Pain Supplemental Questionnaire score was 6/40.    During the past 24 hours, how has pain interfered with normal activities: 4/10  During the past 24 hours, how has pain interfered with your sleep: 2/10  During the past 24 hours, how has pain affected your mood: 0/10  During the past 24 hours, how has pain contributed to your stress: 0/10    Patient Recorded Objective Measure is 57.5 % Lower Extremity Functional Scale (46/80)    Problem Focused Examination revealed:    (Feet, ankle & knee) Ankle joint  function is within normal limits except for her left talocrural joint that exhibited limited passive range of motion and joint restriction and tenderness on palpation;  The following muscles were examined for flexibility and tone at rest; right gastrocnemius, left gastrocnemius, right soleus, left soleus, right tibialis anterior, left tibialis anterior, right tibialis posterior, left tibialis posterior, right peroneals, left peroneals. These muscles were found to be within normal limits except for her left soleus and left lateral gastrocnemius. that exhibited limited flexibility and were hypertonic at rest.    Directional Preference:   Dorsiflexion    Assessment:   1. Insertional Achilles tendinopathy    2. Segmental and somatic dysfunction of lower extremity      Complicating Factors/Comorbidities:   None    PLAN:  Patient was evaluated and then treated with manipulation to her left talocrural joint via long axis manipulation to improve function and passive range of motion of facet joints.  Patient also treated with IASTM and manual muscle manipulation to muscles noted as taut in objective findings to improve flexibility and decrease strain to spinal structures.    Therapeutic Exercise/Rehab/Modalities performed today:   Single Calf Raise with KB weight (20 lbs, 1x10)    Patient Instruction/Education: Perform Banded Ankle Dorsiflexion Mobilizations (2x10/day) and Seated Eccentric Calf Raises (3x12-15/day).     Patient stated understanding of, and was in agreement with, the discussed instructions.  Goals of Care: Goal of care is to improve muscular and skeletal function and provide symptom relief.   Additional Goals Include and to be obtained by end of this plan of care.   To be obtained by end of this plan of care:  1. Patient independent with modified and progressed home exercise program.  2. Patient will decrease symptoms by 60-80% of current Visual Analog Pain Scale Score.  3. Patient’s active range-of-motion  will be within normal limits with no/minimal pain.   4. Patient will be able to walk without pain or difficulty for greater than or equal to 45 minutes.   5. Patient will be able to bend and lift for activities of daily living and instrumental activities of daily living completion at home and work without pain/difficulty.  6. Patient’s DOD/VA pain questionnaire will be decreased by 50-70%.       Other treatment options discussed with patient: None    Patient stated that she felt good after treatment.    Patient is to return next week for continued care and treatment of her condition consistent with plan of care. Patients cervical spine will be evaluated next week.    Treatment was tolerated without incident.     On 12/15/2022, Latosha FANG scribed the services personally performed by Bishnu Doll DC     The documentation recorded by the scribe accurately and completely reflects the service(s) I personally performed and the decisions made by me.    Prophylactic measure Morbid obesity due to excess calories

## 2022-12-19 NOTE — ASSESSMENT
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  Painful onychomycosis/onycholysis  (B35.1) (L60.1).  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  Pain in toes, bilateral (M79).\par \par Treatment: Discussed the importance of glycemic control, diabetic foot care and neuropathic precautions.  \par For the onychomycosis, nails 1 through 5, bilateral were debrided of excessive thickness and length to appropriate levels of comfort and contour using sterile nippers and Dremel.   The procedure was tolerated well without complications.  Failure to perform this treatment based on patient's underlying condition could lead to ulceration and infection.\par Patient is wearing well-fitting sneakers today.  \par Left hallux callus was trimmed with a sterile #23 blade.  \par Return: 3 months.  \par \par

## 2022-12-19 NOTE — HISTORY OF PRESENT ILLNESS
[Sneakers] : thom [FreeTextEntry1] : Patient presents today for management of painful, thickened onychomycotic toenails.  He does not check his finger stick every day and his last A1c is pending. He last saw Dr. Skelton 3 weeks ago.  Patient states that with better sugar control, his numbness in the lower extremities has been feeling better.

## 2022-12-19 NOTE — PHYSICAL EXAM
[Pitting Edema] : pitting edema present [___ +] : bilateral [unfilled]+ pitting edema to both feet [2+] : left foot dorsalis pedis 2+ [Vibration Dec.] : diminished vibratory sensation at the level of the toes [Position Sense Dec.] : diminished position sense at the level of the toes [Diminished Throughout Right Foot] : diminished sensation with monofilament testing throughout right foot [Diminished Throughout Left Foot] : diminished sensation with monofilament testing throughout left foot [Varicose Veins Of Lower Extremities] : not present [FreeTextEntry3] : CFT: 3 seconds x 10.  Temperature gradient: warm to cool. [de-identified] : Adductovarus of the 5th toes, bilateral.  [FreeTextEntry1] : Pedal hair: Absent.  Red discoloration of digits 1 through 5, bilateral.  Skin: Atrophic.  Thickened, elongated, dystrophic, thickened to 3mm, yellowing toenails 1 through 5. Bilateral hallucal nails onycholysis from the nail bed. They are attached at the proximal nail border. No clinical signs of infection. No open lesions. Interspaces are clean and intact. There is dry scaling plantarly bilaterally. \par Left hallux pinch callus.

## 2023-02-23 NOTE — PROCEDURE NOTE - ATTENDING PROVIDER
Tigre Larsen Localized Dermabrasion With Wire Brush Text: The patient was draped in routine manner.  Localized dermabrasion using 3 x 17 mm wire brush was performed in routine manner to papillary dermis. This spot dermabrasion is being performed to complete skin cancer reconstruction. It also will eliminate the other sun damaged precancerous cells that are known to be part of the regional effect of a lifetime's worth of sun exposure. This localized dermabrasion is therapeutic and should not be considered cosmetic in any regard. Localized Dermabrasion Text: The patient was draped in routine manner.  Localized dermabrasion using 3 x 17 mm wire brush was performed in routine manner to papillary dermis. This spot dermabrasion is being performed to complete skin cancer reconstruction. It also will eliminate the other sun damaged precancerous cells that are known to be part of the regional effect of a lifetime's worth of sun exposure. This localized dermabrasion is therapeutic and should not be considered cosmetic in any regard.

## 2023-03-09 ENCOUNTER — APPOINTMENT (OUTPATIENT)
Dept: PULMONOLOGY | Facility: CLINIC | Age: 78
End: 2023-03-09
Payer: MEDICARE

## 2023-03-09 PROCEDURE — 99214 OFFICE O/P EST MOD 30 MIN: CPT | Mod: 95

## 2023-03-09 RX ORDER — BUPROPION HYDROCHLORIDE 150 MG/1
150 TABLET, FILM COATED, EXTENDED RELEASE ORAL
Refills: 0 | Status: ACTIVE | COMMUNITY

## 2023-03-09 RX ORDER — ZONISAMIDE 100 MG/1
100 CAPSULE ORAL
Refills: 0 | Status: ACTIVE | COMMUNITY

## 2023-03-09 RX ORDER — MULTIVITAMIN
TABLET ORAL
Refills: 0 | Status: ACTIVE | COMMUNITY

## 2023-03-09 RX ORDER — METFORMIN HYDROCHLORIDE 1000 MG/1
1000 TABLET, COATED ORAL
Refills: 0 | Status: ACTIVE | COMMUNITY
Start: 2021-05-20

## 2023-03-09 RX ORDER — METHENAMINE HIPPURATE 1 G/1
1 TABLET ORAL
Refills: 0 | Status: DISCONTINUED | COMMUNITY
End: 2023-03-09

## 2023-03-09 RX ORDER — BUSPIRONE HYDROCHLORIDE 10 MG/1
10 TABLET ORAL
Refills: 0 | Status: ACTIVE | COMMUNITY
Start: 2021-05-20

## 2023-03-09 RX ORDER — ORAL SEMAGLUTIDE 14 MG/1
14 TABLET ORAL
Refills: 0 | Status: ACTIVE | COMMUNITY

## 2023-03-09 RX ORDER — ATORVASTATIN CALCIUM 40 MG/1
40 TABLET, FILM COATED ORAL
Refills: 0 | Status: ACTIVE | COMMUNITY
Start: 2021-05-20

## 2023-03-09 RX ORDER — MULTIVIT-MIN/IRON/FOLIC ACID/K 18-600-40
CAPSULE ORAL
Refills: 0 | Status: ACTIVE | COMMUNITY

## 2023-03-09 RX ORDER — NALTREXONE HYDROCHLORIDE 50 MG/1
50 TABLET, FILM COATED ORAL
Refills: 0 | Status: ACTIVE | COMMUNITY

## 2023-03-09 RX ORDER — EMPAGLIFLOZIN 10 MG/1
10 TABLET, FILM COATED ORAL
Refills: 0 | Status: ACTIVE | COMMUNITY

## 2023-03-09 RX ORDER — EMPAGLIFLOZIN 25 MG/1
25 TABLET, FILM COATED ORAL
Refills: 0 | Status: DISCONTINUED | COMMUNITY
Start: 2021-05-20 | End: 2023-03-09

## 2023-03-09 NOTE — PHYSICAL EXAM
[General Appearance - In No Acute Distress] : no acute distress [] : no respiratory distress [Oriented To Time, Place, And Person] : oriented to person, place, and time [Impaired Insight] : insight and judgment were intact [Mood] : the mood was normal [FreeTextEntry1] : patient able to speak in full clear sentences without difficulty

## 2023-03-09 NOTE — ASSESSMENT
[FreeTextEntry1] : 78 y/o M with long history of severe MALLY--AHI 40/hr, initially diagnosed in 1995, on PAP  therapy since, presents for a follow-up visit after receiving a new APAP device to continue with therapy. The patient continues to derive benefit from APAP (9-20 cmH20),using a  Eson nasal mask, with normalization of AHI to 2.5/hr, and resolution to prior apnea - related symptoms. Compliance shows 100% of nightly use, averaging 6 hrs and 49 minutes, with no significant mask leak. Therefore, patient will continue with nightly  APAP use, as prescribed. Rx for renewal of pap supples was placed to [facility], [place]. The ramifications of untreated sleep apnea and the importance of continued treatment were discussed with the patient. \par F/U in 6 to 12 months or sooner if needed.

## 2023-03-09 NOTE — REASON FOR VISIT
[Follow-Up] : a follow-up visit [Home] : at home, [unfilled] , at the time of the visit. [Medical Office: ([facility])___] : at the medical office located in  [Patient] : the patient [This encounter was initiated by telehealth (audio with video) and converted to telephone (audio only) due to technical difficulties.] : This encounter was initiated by telehealth (audio with video) and converted to telephone (audio only) due to technical difficulties. [Sleep Apnea] : sleep apnea

## 2023-03-09 NOTE — HISTORY OF PRESENT ILLNESS
[CPAP: ___ cmH2O] : CPAP: [unfilled] cmH2O [AHI: ___ per hour] : Apnea-hypopnea index:  [unfilled] per hour [T90%: ___] : T90%: [unfilled]% [Joe desatuation%: ___] : Joe desaturation:  [unfilled]% [Date: ___] : the most recent therapeutic polysomnogram was completed [unfilled] [FreeTextEntry1] : 78 y/o M with h/o DM II, HLD, BPH, CVA, and long history of severe MALLY--AHI 40/hr, T90 21%, initially diagnosed in 1995, on PAP therapy since, presents for follow-up after receiving a new CPAP to continue with therapy. He is currently on APAP mode, on pressures 9-20 cmH2O, With nightly APAP use, he reports continued improvement to his sleep quality, and feels well rested. He denies AM headaches, and unintentional sleep episodes during his wake hours, with an ESS Score of: 5 from today. The patient does not drive. The ESON nasal mask and pressures are well tolerated. The patient denies interim changes to his health and weight, with current reported weight of 280 lbs.   [Nocturnal Oxygen] : The patient does not use nocturnal oxygen [ESS] : 5

## 2023-03-09 NOTE — REVIEW OF SYSTEMS
[Diabetes] : diabetes  [Obesity] : obesity [Nocturia] : nocturia [Arthralgias] : arthralgias [Fatigue] : no fatigue [Nasal Congestion] : no nasal congestion [Postnasal Drip] : no postnasal drip [A.M. Dry Mouth] : no a.m. dry mouth [Chest Pain] : no chest pain [Palpitations] : no palpitations [Thyroid Disease] : no thyroid disease [History of Iron Deficiency] : no history of iron deficiency [A.M. Headache] : no headache present upon awakening [Heartburn] : no heartburn [Snoring] : no snoring [Witnessed Apneas] : demonstrated no ~M apnea [Frequent Nocturnal Awakenings] : no nocturnal awakenings from sleep [Daytime Somnolence: ESS=____] : no daytime somnolence [Unintentional Sleep while active] : no unintentional sleep while active [Unintentional Sleep while inactive] : no unintentional sleep while inactive [Awakes Unrefreshed] : restorative sleep [Awakes With Headache] : awakes without a headache [Awakes With Dry Mouth] : awakes without dry mouth [Recent Wt Loss (___ Lbs)] : no recent weight loss [Recent Wt Gain (___ Lbs)] : no recent weight gain [Difficulty Initiating Sleep] : no difficulty falling asleep [Difficulty Maintaining Sleep] : no difficulty maintaining sleep [Irresistible urge to move legs] : no irresistible urge to move legs because of lower extremity discomfort [LE discomfort relieved by movement] : lower extremity discomfort not relieved by movement [Sleep Disturbances due to LE symptoms] : ~T no sleep disturbances due to lower extremity symptoms [Unusual Sleep Behavior] : no unusual sleep behavior [Hypersomnolence] : not sleeping much more than usual [Cataplexy] :  no cataplexy [Hypnogogic Hallucinations] : no hypnogogic hallucinations [Hypnopompic Hallucinations] : no hypnopompic hallucinations [Sleep Paralysis] : no sleep paralysis [de-identified] : h/o CVA [de-identified] : On Wellbutrin daily  [FreeTextEntry1] : 12- 2 qam [FreeTextEntry2] : 7:30 am [FreeTextEntry3] : brief [FreeTextEntry4] : 0-1 [FreeTextEntry5] : "right back" [FreeTextEntry6] : 7-hours  [FreeTextEntry7] : "rare"

## 2023-03-12 ENCOUNTER — NON-APPOINTMENT (OUTPATIENT)
Age: 78
End: 2023-03-12

## 2023-03-13 ENCOUNTER — APPOINTMENT (OUTPATIENT)
Dept: UROLOGY | Facility: CLINIC | Age: 78
End: 2023-03-13
Payer: MEDICARE

## 2023-03-13 PROCEDURE — 88112 CYTOPATH CELL ENHANCE TECH: CPT | Mod: 26

## 2023-03-13 PROCEDURE — 99214 OFFICE O/P EST MOD 30 MIN: CPT

## 2023-03-14 ENCOUNTER — NON-APPOINTMENT (OUTPATIENT)
Age: 78
End: 2023-03-14

## 2023-03-14 LAB
APPEARANCE: ABNORMAL
BACTERIA: NEGATIVE
BILIRUBIN URINE: NEGATIVE
BLOOD URINE: ABNORMAL
COLOR: ABNORMAL
GLUCOSE QUALITATIVE U: ABNORMAL
KETONES URINE: NORMAL
LEUKOCYTE ESTERASE URINE: ABNORMAL
MICROSCOPIC-UA: NORMAL
NITRITE URINE: POSITIVE
PH URINE: 7
PROTEIN URINE: ABNORMAL
RED BLOOD CELLS URINE: >720 /HPF
SPECIFIC GRAVITY URINE: 1.01
SQUAMOUS EPITHELIAL CELLS: 0 /HPF
UROBILINOGEN URINE: NORMAL
WHITE BLOOD CELLS URINE: 1 /HPF

## 2023-03-16 LAB
BACTERIA UR CULT: NORMAL
URINE CYTOLOGY: NORMAL

## 2023-03-16 NOTE — ASSESSMENT
[FreeTextEntry1] : Gross hematuria. Does not sounds like UTI. Suspect prostatic bleeding. No retention. hx of BPH s/p SPP with known residual tissue. \par --hematuria precautions\par --encourage fluid intake\par --avoid strain\par --UA, UCx, cyto\par --begin finasteride\par --If UCx negative, plan on CTU and cysto\par \par \par

## 2023-03-16 NOTE — PHYSICAL EXAM
[General Appearance - Well Developed] : well developed [General Appearance - Well Nourished] : well nourished [General Appearance - In No Acute Distress] : no acute distress [Abdomen Soft] : soft [Abdomen Tenderness] : non-tender [Costovertebral Angle Tenderness] : no ~M costovertebral angle tenderness [Exaggerated Use Of Accessory Muscles For Inspiration] : no accessory muscle use [Oriented To Time, Place, And Person] : oriented to person, place, and time [Normal Station and Gait] : the gait and station were normal for the patient's age [FreeTextEntry1] : protuberant, umbo hernia

## 2023-03-16 NOTE — HISTORY OF PRESENT ILLNESS
[FreeTextEntry1] : 78yo gentleman with cc BPH. Pt previously seen by Dr Castillo and then Dr Luis. Has hx of RAL SPP 10/2017 with Dr Castillo with path showing 139gm benign tissue. he had great improvement in his sx following this with good stream and no real LUTS. He was previously on CIC. Now at baseline goes 8-10 times per day and gets up 2x per night. Occasional hesitancy. great stream. No dribbling. Pre-tx PSA was 11 with hx of negative bx. \par \par He has had variable PSA since then. Most recent PSA was 9.2 in May 2021. PSA was up to 16 in 9/2020. Prostate (post surgery) is 135gm with no suspicious lesions on MRI from 8/2020. No recent PSA. No family hx of prostate ca. \par \par Pt added on for acute appt today due to gross hematuria. He reports he voided saturday night and noted a little pressure and then saw gross blood. No preceding change in urinary sx. No straining. Has passed some clots but has been mostly without clots today. Never had significant blood like this before. Has not been drinking a lot for fear of issues. Not on any blood thinners. Was constipated but had BM today. He is on jardiance and metformin and rybelsus. Reports good control. PCP started pt on methenamine 500mg daily with Vit C. No issues with UTIs. Has 20 pack year hx, quit in 1990. No exposure hx. Mom passed from lung ca (non smoker but parents heavy smokers). \par \par PVR initially close to 400. Pt voided again and PVR 125cc.

## 2023-04-11 ENCOUNTER — APPOINTMENT (OUTPATIENT)
Dept: PODIATRY | Facility: CLINIC | Age: 78
End: 2023-04-11
Payer: MEDICARE

## 2023-04-11 PROCEDURE — 11721 DEBRIDE NAIL 6 OR MORE: CPT | Mod: 59

## 2023-04-11 PROCEDURE — 11055 PARING/CUTG B9 HYPRKER LES 1: CPT

## 2023-04-17 NOTE — HISTORY OF PRESENT ILLNESS
[Sneakers] : thom [FreeTextEntry1] : Patient presents today for management of painful, thickened onychomycotic toenails and left IPK as well as diabetic foot check up.  He does not check his finger stick every day.  Last A1c was 6%.  He last saw Dr. Skelton today.

## 2023-04-17 NOTE — PHYSICAL EXAM
[Pitting Edema] : pitting edema present [___ +] : bilateral [unfilled]+ pitting edema to both feet [2+] : left foot dorsalis pedis 2+ [Vibration Dec.] : diminished vibratory sensation at the level of the toes [Position Sense Dec.] : diminished position sense at the level of the toes [Diminished Throughout Right Foot] : diminished sensation with monofilament testing throughout right foot [Diminished Throughout Left Foot] : diminished sensation with monofilament testing throughout left foot [Varicose Veins Of Lower Extremities] : not present [FreeTextEntry3] : CFT: 3 seconds x 10.  Temperature gradient: warm to cool. [de-identified] : Adductovarus of the 5th toes, bilateral.  [FreeTextEntry1] : Pedal hair: Absent.  Red discoloration of digits 1 through 5, bilateral.  Skin: Atrophic.  Thickened, elongated, dystrophic, thickened to 3mm, yellowing toenails 1 through 5. Bilateral hallucal nails onycholysis from the nail bed. They are attached at the proximal nail border. No clinical signs of infection. No open lesions. Interspaces are clean and intact. There is dry scaling plantarly bilaterally. \par Left hallux pinch callus.

## 2023-04-18 ENCOUNTER — APPOINTMENT (OUTPATIENT)
Dept: CT IMAGING | Facility: IMAGING CENTER | Age: 78
End: 2023-04-18
Payer: MEDICARE

## 2023-04-18 ENCOUNTER — OUTPATIENT (OUTPATIENT)
Dept: OUTPATIENT SERVICES | Facility: HOSPITAL | Age: 78
LOS: 1 days | End: 2023-04-18
Payer: MEDICARE

## 2023-04-18 DIAGNOSIS — Z98.890 OTHER SPECIFIED POSTPROCEDURAL STATES: Chronic | ICD-10-CM

## 2023-04-18 DIAGNOSIS — R31.0 GROSS HEMATURIA: ICD-10-CM

## 2023-04-18 DIAGNOSIS — Z12.11 ENCOUNTER FOR SCREENING FOR MALIGNANT NEOPLASM OF COLON: Chronic | ICD-10-CM

## 2023-04-18 PROCEDURE — 74178 CT ABD&PLV WO CNTR FLWD CNTR: CPT | Mod: 26,MH

## 2023-04-18 PROCEDURE — 74178 CT ABD&PLV WO CNTR FLWD CNTR: CPT

## 2023-04-26 ENCOUNTER — APPOINTMENT (OUTPATIENT)
Dept: UROLOGY | Facility: CLINIC | Age: 78
End: 2023-04-26
Payer: MEDICARE

## 2023-04-26 ENCOUNTER — NON-APPOINTMENT (OUTPATIENT)
Age: 78
End: 2023-04-26

## 2023-04-26 ENCOUNTER — OUTPATIENT (OUTPATIENT)
Dept: OUTPATIENT SERVICES | Facility: HOSPITAL | Age: 78
LOS: 1 days | End: 2023-04-26
Payer: MEDICARE

## 2023-04-26 VITALS
TEMPERATURE: 98.6 F | HEART RATE: 88 BPM | SYSTOLIC BLOOD PRESSURE: 155 MMHG | DIASTOLIC BLOOD PRESSURE: 79 MMHG | RESPIRATION RATE: 17 BRPM

## 2023-04-26 DIAGNOSIS — R35.0 FREQUENCY OF MICTURITION: ICD-10-CM

## 2023-04-26 DIAGNOSIS — Z12.11 ENCOUNTER FOR SCREENING FOR MALIGNANT NEOPLASM OF COLON: Chronic | ICD-10-CM

## 2023-04-26 DIAGNOSIS — Z98.890 OTHER SPECIFIED POSTPROCEDURAL STATES: Chronic | ICD-10-CM

## 2023-04-26 PROCEDURE — 52000 CYSTOURETHROSCOPY: CPT

## 2023-04-28 DIAGNOSIS — R31.0 GROSS HEMATURIA: ICD-10-CM

## 2023-06-30 ENCOUNTER — APPOINTMENT (OUTPATIENT)
Dept: UROLOGY | Facility: CLINIC | Age: 78
End: 2023-06-30
Payer: MEDICARE

## 2023-06-30 ENCOUNTER — OUTPATIENT (OUTPATIENT)
Dept: OUTPATIENT SERVICES | Facility: HOSPITAL | Age: 78
LOS: 1 days | End: 2023-06-30
Payer: MEDICARE

## 2023-06-30 DIAGNOSIS — Z98.890 OTHER SPECIFIED POSTPROCEDURAL STATES: Chronic | ICD-10-CM

## 2023-06-30 DIAGNOSIS — R35.0 FREQUENCY OF MICTURITION: ICD-10-CM

## 2023-06-30 DIAGNOSIS — Z12.11 ENCOUNTER FOR SCREENING FOR MALIGNANT NEOPLASM OF COLON: Chronic | ICD-10-CM

## 2023-06-30 DIAGNOSIS — R31.0 GROSS HEMATURIA: ICD-10-CM

## 2023-06-30 PROCEDURE — 52000 CYSTOURETHROSCOPY: CPT

## 2023-07-10 PROBLEM — R31.0 GROSS HEMATURIA: Status: ACTIVE | Noted: 2023-03-13

## 2023-07-11 DIAGNOSIS — R31.0 GROSS HEMATURIA: ICD-10-CM

## 2023-07-11 LAB
APPEARANCE: CLEAR
BACTERIA UR CULT: NORMAL
BACTERIA: NEGATIVE /HPF
BILIRUBIN URINE: NEGATIVE
BLOOD URINE: NEGATIVE
CAST: 0 /LPF
COLOR: YELLOW
EPITHELIAL CELLS: 0 /HPF
GLUCOSE QUALITATIVE U: >=1000 MG/DL
KETONES URINE: NEGATIVE MG/DL
LEUKOCYTE ESTERASE URINE: NEGATIVE
MICROSCOPIC-UA: NORMAL
NITRITE URINE: NEGATIVE
PH URINE: 6
PROTEIN URINE: NEGATIVE MG/DL
RED BLOOD CELLS URINE: 0 /HPF
SPECIFIC GRAVITY URINE: 1.01
UROBILINOGEN URINE: 0.2 MG/DL
WHITE BLOOD CELLS URINE: 0 /HPF

## 2023-07-26 ENCOUNTER — APPOINTMENT (OUTPATIENT)
Dept: PODIATRY | Facility: CLINIC | Age: 78
End: 2023-07-26
Payer: MEDICARE

## 2023-07-26 DIAGNOSIS — E11.42 TYPE 2 DIABETES MELLITUS WITH DIABETIC POLYNEUROPATHY: ICD-10-CM

## 2023-07-26 PROCEDURE — 11721 DEBRIDE NAIL 6 OR MORE: CPT | Mod: 59

## 2023-07-26 PROCEDURE — 11055 PARING/CUTG B9 HYPRKER LES 1: CPT

## 2023-07-28 PROBLEM — E11.42 CONTROLLED TYPE 2 DIABETES MELLITUS WITH DIABETIC POLYNEUROPATHY: Status: ACTIVE | Noted: 2022-08-26

## 2023-08-04 NOTE — HISTORY OF PRESENT ILLNESS
[Sneakers] : thom [FreeTextEntry1] : Patient presents today for management of painful, thickened onychomycotic toenails and left IPK as well as diabetic foot check up.  He does not check his finger stick every day.  Last A1c was 6.1%.  He last saw his primary care doctor, Dr. Skelton in July, reports no new medical changes.  \par Patient will be going for cataract surgery soon.

## 2023-08-04 NOTE — PHYSICAL EXAM
[Pitting Edema] : pitting edema present [___ +] : bilateral [unfilled]+ pitting edema to both feet [2+] : left foot dorsalis pedis 2+ [Vibration Dec.] : diminished vibratory sensation at the level of the toes [Position Sense Dec.] : diminished position sense at the level of the toes [Diminished Throughout Right Foot] : diminished sensation with monofilament testing throughout right foot [Diminished Throughout Left Foot] : diminished sensation with monofilament testing throughout left foot [Varicose Veins Of Lower Extremities] : not present [FreeTextEntry3] : CFT: 3 seconds x 10.  Temperature gradient: warm to cool. [de-identified] : Adductovarus of the 5th toes, bilateral.  [FreeTextEntry1] : Pedal hair: Absent.  Red discoloration of digits 1 through 5, bilateral.  Skin: Atrophic.  Thickened, elongated, dystrophic, thickened to 3mm, yellowing toenails 1 through 5. Bilateral hallucal nails onycholysis from the nail bed. They are attached at the proximal nail border. No clinical signs of infection. No open lesions. Interspaces are clean and intact. There is dry scaling plantarly bilaterally. \par Left hallux pinch callus.

## 2023-10-25 ENCOUNTER — APPOINTMENT (OUTPATIENT)
Dept: UROLOGY | Facility: CLINIC | Age: 78
End: 2023-10-25
Payer: MEDICARE

## 2023-10-25 DIAGNOSIS — R33.9 RETENTION OF URINE, UNSPECIFIED: ICD-10-CM

## 2023-10-25 DIAGNOSIS — N13.8 BENIGN PROSTATIC HYPERPLASIA WITH LOWER URINARY TRACT SYMPMS: ICD-10-CM

## 2023-10-25 DIAGNOSIS — N40.1 BENIGN PROSTATIC HYPERPLASIA WITH LOWER URINARY TRACT SYMPMS: ICD-10-CM

## 2023-10-25 PROCEDURE — 99213 OFFICE O/P EST LOW 20 MIN: CPT

## 2023-10-26 LAB
APPEARANCE: CLEAR
BILIRUBIN URINE: NEGATIVE
BLOOD URINE: NEGATIVE
COLOR: YELLOW
GLUCOSE QUALITATIVE U: >=1000 MG/DL
KETONES URINE: NEGATIVE MG/DL
LEUKOCYTE ESTERASE URINE: NEGATIVE
NITRITE URINE: NEGATIVE
PH URINE: 6.5
PROTEIN URINE: NEGATIVE MG/DL
SPECIFIC GRAVITY URINE: 1.02
UROBILINOGEN URINE: 0.2 MG/DL

## 2023-11-21 ENCOUNTER — APPOINTMENT (OUTPATIENT)
Dept: PODIATRY | Facility: CLINIC | Age: 78
End: 2023-11-21
Payer: MEDICARE

## 2023-11-21 DIAGNOSIS — L85.1 ACQUIRED KERATOSIS [KERATODERMA] PALMARIS ET PLANTARIS: ICD-10-CM

## 2023-11-21 PROCEDURE — 11721 DEBRIDE NAIL 6 OR MORE: CPT | Mod: 59

## 2023-11-21 PROCEDURE — 11055 PARING/CUTG B9 HYPRKER LES 1: CPT

## 2023-11-28 PROBLEM — L85.1 ACQUIRED KERATODERMA: Status: ACTIVE | Noted: 2022-08-26

## 2024-01-05 NOTE — H&P PST ADULT - NEGATIVE ENMT SYMPTOMS
[de-identified] : Here for post op visit s/p right shoulder arthroscopy. Doing well. Going to PT.  no ear pain/no tinnitus/no hearing difficulty/no nasal obstruction/no nose bleeds/no nasal congestion/no nasal discharge/no throat pain/no dysphagia/no vertigo/no sinus symptoms

## 2024-02-23 ENCOUNTER — APPOINTMENT (OUTPATIENT)
Dept: PODIATRY | Facility: CLINIC | Age: 79
End: 2024-02-23
Payer: MEDICARE

## 2024-02-23 DIAGNOSIS — M79.671 PAIN IN RIGHT FOOT: ICD-10-CM

## 2024-02-23 DIAGNOSIS — I82.409 ACUTE EMBOLISM AND THROMBOSIS OF UNSPECIFIED DEEP VEINS OF UNSPECIFIED LOWER EXTREMITY: ICD-10-CM

## 2024-02-23 DIAGNOSIS — R60.0 LOCALIZED EDEMA: ICD-10-CM

## 2024-02-23 PROCEDURE — 73630 X-RAY EXAM OF FOOT: CPT | Mod: RT

## 2024-02-23 PROCEDURE — 99213 OFFICE O/P EST LOW 20 MIN: CPT

## 2024-02-26 PROBLEM — M79.671 RIGHT FOOT PAIN: Status: ACTIVE | Noted: 2024-02-26

## 2024-02-26 PROBLEM — R60.0 EDEMA OF RIGHT FOOT: Status: ACTIVE | Noted: 2024-02-26

## 2024-02-26 PROBLEM — I82.409 DEEP VEIN THROMBOSIS: Status: ACTIVE | Noted: 2024-02-26

## 2024-02-28 NOTE — HISTORY OF PRESENT ILLNESS
[FreeTextEntry1] : Patient returns today with a new complaint of right lower extremity swelling and discomfort that started this week. Patient states that he has not been as ambulatory as usual. He denies any cough or shortness of breath. He states that his right calf feels cheney than the left. He has been raising his legs. With elevation the swelling has improved. Patient's last A1c was 7. Last saw PCP in October. He has an appointment coming up next week with his primary care doctor. Denies any new medical changes.

## 2024-02-28 NOTE — PROCEDURE
[FreeTextEntry1] : X-ray Report: X-ray of the right foot, 3 views weight-bearing was taken to evaluate for any underlying fracture or Charcot. No acute fracture, dislocations or erosions present. Join spaces are even and congruent.

## 2024-02-28 NOTE — PHYSICAL EXAM
[2+] : left foot dorsalis pedis 2+ [Vibration Dec.] : diminished vibratory sensation at the level of the toes [Diminished Throughout Right Foot] : diminished sensation with monofilament testing throughout right foot [Position Sense Dec.] : diminished position sense at the level of the toes [Diminished Throughout Left Foot] : diminished sensation with monofilament testing throughout left foot [Varicose Veins Of Lower Extremities] : not present [FreeTextEntry3] : CFT: 3 seconds x10. Temperature gradient warm to warm bilaterally. [de-identified] : Adductovarus of the 5th digits bilaterally, unchanged from prior. Discomfort on palpation over the right midfoot with no gross deformities. [FreeTextEntry1] : Right lower extremity no open lesions. No increase in temperature. No fluctuance, soft tissue crepitus or necrosis. Minimal edema in the right foot with skin wrinkles intact.

## 2024-02-28 NOTE — ASSESSMENT
[FreeTextEntry1] : Impression: Diabetes with neuropathy. Right foot pain. Right leg edema. R/O DVT.  Treatment: I advised patient to obtain lower extremity ultrasound today to R/O DVT. If the ultrasound is positive, I advised patient to go to the Emergency Room. If it is negative I advised patient to keep his appointment with his primary care doctor next Thursday. Continue elevation. Hold off on compression stockings at this time as when he wears them on the right side it causes constriction to his calf. Will see patient back in 2 weeks for nail care, or earlier if he has any problems. Right foot pain is likely from increased edema to the right lower extremity.

## 2024-03-12 ENCOUNTER — APPOINTMENT (OUTPATIENT)
Dept: PODIATRY | Facility: CLINIC | Age: 79
End: 2024-03-12
Payer: MEDICARE

## 2024-03-12 DIAGNOSIS — G62.9 POLYNEUROPATHY, UNSPECIFIED: ICD-10-CM

## 2024-03-12 DIAGNOSIS — R60.9 EDEMA, UNSPECIFIED: ICD-10-CM

## 2024-03-12 DIAGNOSIS — L85.3 XEROSIS CUTIS: ICD-10-CM

## 2024-03-12 PROCEDURE — 11721 DEBRIDE NAIL 6 OR MORE: CPT

## 2024-03-12 PROCEDURE — 99213 OFFICE O/P EST LOW 20 MIN: CPT | Mod: 25

## 2024-03-12 NOTE — REASON FOR VISIT
ED Resident Physician Note    Patient : Usama Sharpe Age: 67 year old Sex: female   MRN: [id number] Encounter Date: 12/25/2023      History     Chief Complaint   Patient presents with    Abdominal Pain     HPI  67-year-old female with a history of small bowel obstruction 2 years ago, take spironolactone as well as Synthroid for hypothyroidism who presents with abdominal pain.  The abdominal pain started yesterday and is periumbilical and colicky.  Nothing seems to worsen or improve the pain.  She noted 1 episode of vomiting last night.  She denies nausea currently.  She has noted constipation and states that her last bowel movement was yesterday at 6 PM.  She continues to pass gas.  She denies any urinary symptoms.  No melena or hematochezia.    No Known Allergies      Vitals     ED Triage Vitals [12/25/23 1016]   ED Triage Vitals Group      Temp 98.4 °F (36.9 °C)      Heart Rate 74      Resp 18      BP (!) 159/76      SpO2 97 %      EtCO2 mmHg       Height       Weight 183 lb (83 kg)      Weight Scale Used ED Stated      BMI (Calculated)       IBW/kg (Calculated)          Focused Physical Exam     Constitutional: Awake, alert, NAD  Head: Atraumatic, normocephalic  Eyes: EOMI, sclera non-icteric  Neck: Supple, trachea midline  CVS: RRR, no murmurs, 2+ radial pulses  Respiratory/chest: No respiratory distress, breathing not labored  Abdomen: soft, epigastric and periumbilical tenderness, non-distended  Back: No midline tenderness, no CVA tenderness  Neuro: A&Ox3, no facial droop, normal speech, moves all extremities equally, sensation grossly intact  Extremities: Normal ROM, no swelling, no deformities  Skin: Warm, dry  Psych: Cooperative, normal mood, normal affect    ED Course   Pt w/ hx and physical exam as above. Presenting for periumbilical pain which she describes as squeezing and colicky.  Her vitals are stable.  She does have a history of SBO, no prior surgeries though given her history she is at high  risk for this.  She does not have any flank pain or urinary symptoms lower suspicion for kidney stone especially with the pain in the center of the abdomen.  No history of hypercoagulability, she is not taking blood thinners, no history of atherosclerosis lower suspicion for mesenteric ischemia.  No diarrhea to suggest gastroenteritis.  Will reassess the patient after labs and imaging.      ED Course as of 23 1410   Mon Dec 25, 2023   1124 Electrocardiogram 12-Lead  EK bpm, normal sinus rhythm, normal intervals, no MICHEAL [AA]   1225 Sodium(!): 130  Patient appears euvolemic, hyponatremia may be in the setting of spironolactone [AA]   1409 Endorsed to hospitalist and surgery.  Patient has a positive Guillen sign and signs of gallbladder distention as well as cholelithiasis on CT.  Will obtain ultrasound.  Start Zosyn [AA]      ED Course User Index  [AA] Mey Esparza       Procedures    Clinical Impression     ED Diagnosis   1. Acute cholecystitis            Disposition        Admit 2023  2:09 PM  Telemetry Bed?: No  Admitting Physician: KATIA WEEKS [[id number]]  Is this a telephone or verbal order?: This is a telephone order from the admitting physician  Transferring Patient to? Only adjust for [facility] ([facility]): [facility] [[id number]]      Case discussed with and pt evaluated by attending . . Refer to attending note for further documentation.    Mey Esparza MD  ED PGY-3       Mey Esparza  Resident  23 1410     [Follow-Up Visit] : a follow-up visit for [Foot Pain] : foot pain [Onychomycosis] : onychomycosis

## 2024-03-13 PROBLEM — R60.9 EDEMA: Status: ACTIVE | Noted: 2024-03-13

## 2024-03-13 PROBLEM — G62.9 PERIPHERAL NEUROPATHY: Status: ACTIVE | Noted: 2024-03-13

## 2024-03-13 PROBLEM — L85.3 XEROSIS CUTIS: Status: ACTIVE | Noted: 2024-03-13

## 2024-03-21 NOTE — ASSESSMENT
[FreeTextEntry1] : Impression: Diabetic with neurological complications. Peripheral neuropathy. Edema bilaterally. Onychomycosis x10. Xerosis.  Treatment: Discussed findings and conditions with the patient. Patient is to continue diabetic pedal care. He is to visually inspect his feet daily as he cannot depend solely on feel. I also discussed with his wife, who was also present. The patient is to continue with antifungal nail therapy PO Lamisil as per Dr. Ball and follow-up with his physician. He was advised he could soak the toes in a one-to-one mixture of white vinegar and water daily for approximately 5 to 10 minutes, gently cleanse around the nail to help alleviate the subungual debris, dry well prior to any application of medications. He is to moisturize his feet daily and avoid walking barefoot. Use Lysol inside all shoes and white socks if possible and bleach with laundry. Patient has a pair of vascular socks present which seem to be well fitted and not too tight for the patient. He was advised to remove throughout the day if there is increase in tightness or signs of irritation with the skin.  All mycotic nails were prepped and manually and mechanically debrided to patient's tolerance without incidence. Patient is to return in approximately 2 months. Any pain, problems or concern, he is to contact the office.

## 2024-03-21 NOTE — HISTORY OF PRESENT ILLNESS
[FreeTextEntry1] : Patient presents today for follow-up and treatment of mycotic toenails. He states his doctor, Dr. Skelton had prescribed him Lamisil. He has been taking it on a pulse dose regimen for the past week. He denies any stomach upset, issues or complaints. His last hemoglobin A1c was approximately 7. He saw his doctor approximately a week and half to 2 weeks ago. He denies any other pedal complaints at this time. He does get swelling in the legs. He has a history of bilateral edema also being treated by his primary doctor. He was given a water pill to help maintain the pitting edema.  He denies any ulcers at this time. He is also inquiring regarding compression socks and further treatment recommendations. Patient denies any other pedal complaints except for also the fact that he has some abnormal numbness to the forefoot, which he has not addressed previously to his medical doctor. He denies any history of back surgery or back issues. He is diabetic but has been maintained. He states this feeling has been present for years prior to his diagnosis of diabetes.

## 2024-03-21 NOTE — PHYSICAL EXAM
[Varicose Veins Of Lower Extremities] : bilaterally [2+] : left foot dorsalis pedis 2+ [FreeTextEntry3] : Negative calf tenderness. Negative signs of cellulitis bilaterally. Negative hair growth. Skin turgor is decreased. CFT: 3 seconds x10. Temperature gradient warmer distally bilaterally. There is brawny skin texture on bilateral lower extremities distal tibia bilateral. [de-identified] : Adductovarus rotation at the 5th digits bilateral. Muscle strength is 5/5 bilaterally. [FreeTextEntry4] : absent vibratory at the 1st MPJ and decreased at the medial malleoli [FreeTextEntry8] : absent vibratory at the 1st MPJ and decreased at the medial malleoli [FreeTextEntry1] : [place]-Fernandez monofilament testing absent at the hallux and lesser digits and diminished at the midfoot.

## 2024-04-17 RX ORDER — FINASTERIDE 5 MG/1
5 TABLET, FILM COATED ORAL DAILY
Qty: 30 | Refills: 11 | Status: ACTIVE | COMMUNITY
Start: 2023-03-14 | End: 1900-01-01

## 2024-05-17 ENCOUNTER — APPOINTMENT (OUTPATIENT)
Dept: UROLOGY | Facility: CLINIC | Age: 79
End: 2024-05-17

## 2024-05-21 ENCOUNTER — APPOINTMENT (OUTPATIENT)
Dept: PULMONOLOGY | Facility: CLINIC | Age: 79
End: 2024-05-21
Payer: MEDICARE

## 2024-05-21 DIAGNOSIS — G47.33 OBSTRUCTIVE SLEEP APNEA (ADULT) (PEDIATRIC): ICD-10-CM

## 2024-05-21 PROCEDURE — 99214 OFFICE O/P EST MOD 30 MIN: CPT

## 2024-05-21 NOTE — HISTORY OF PRESENT ILLNESS
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 He is currently on APAP mode, on pressures 9-20 cmH20, using it nightly with continued improvement to his sleep quality, and feels well rested.  Of note, he reports feeling irritable and tired when his PAP device stopped working and he had to await a replacement device from the vendor. He denies AM headaches, and excessive daytime somnolence. The patient does not drive. The ESON nasal mask and pressures are well tolerated. The patient denies interim changes to his health and weight, with current reported weight of 280 lbs.   [Nocturnal Oxygen] : The patient does not use nocturnal oxygen

## 2024-05-21 NOTE — ASSESSMENT
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 The patient continues to derive benefit from APAP (9-20 cmH20,,using an Eson nasal mask, with normalization of AHI to 2.9/hr, and resolution to prior apnea - related symptoms. Compliance shows 100% of nightly use, averaging 7 hrs and 1 minute. Therefore, patient will continue with nightly APAP use, as prescribed. Rx for renewal of pap supplies was placed to [facility]. The ramifications of untreated sleep apnea and the importance of continued treatment were discussed with the patient.  F/U in 6 to 12 months or sooner if needed.

## 2024-05-21 NOTE — REASON FOR VISIT
[Follow-Up] : a follow-up visit [Sleep Apnea] : sleep apnea [Home] : at home, [unfilled] , at the time of the visit. [Medical Office: ([facility])___] : at the medical office located in  [Patient] : the patient

## 2024-05-21 NOTE — PHYSICAL EXAM
[General Appearance - In No Acute Distress] : no acute distress [] : no respiratory distress [Oriented To Time, Place, And Person] : oriented to person, place, and time [Impaired Insight] : insight and judgment were intact [Mood] : the mood was normal [Normal Appearance] : normal appearance [Exaggerated Use Of Accessory Muscles For Inspiration] : no accessory muscle use

## 2024-05-21 NOTE — REVIEW OF SYSTEMS
[Obesity] : obesity [Diabetes] : diabetes  [Nocturia] : nocturia [Arthralgias] : arthralgias [Unintentional Sleep while inactive] : unintentional sleep while inactive [Fatigue] : no fatigue [Nasal Congestion] : no nasal congestion [Postnasal Drip] : no postnasal drip [A.M. Dry Mouth] : no a.m. dry mouth [Chest Pain] : no chest pain [Palpitations] : no palpitations [Thyroid Disease] : no thyroid disease [History of Iron Deficiency] : no history of iron deficiency [A.M. Headache] : no headache present upon awakening [Heartburn] : no heartburn [Snoring] : no snoring [Witnessed Apneas] : demonstrated no ~M apnea [Frequent Nocturnal Awakenings] : no nocturnal awakenings from sleep [Daytime Somnolence: ESS=____] : no daytime somnolence [Unintentional Sleep while active] : no unintentional sleep while active [Awakes Unrefreshed] : restorative sleep [Awakes With Headache] : awakes without a headache [Awakes With Dry Mouth] : awakes without dry mouth [Recent Wt Loss (___ Lbs)] : no recent weight loss [Recent Wt Gain (___ Lbs)] : no recent weight gain [Difficulty Initiating Sleep] : no difficulty falling asleep [Difficulty Maintaining Sleep] : no difficulty maintaining sleep [Irresistible urge to move legs] : no irresistible urge to move legs because of lower extremity discomfort [LE discomfort relieved by movement] : lower extremity discomfort not relieved by movement [Sleep Disturbances due to LE symptoms] : ~T no sleep disturbances due to lower extremity symptoms [Unusual Sleep Behavior] : no unusual sleep behavior [Hypersomnolence] : not sleeping much more than usual [Cataplexy] :  no cataplexy [Hypnogogic Hallucinations] : no hypnogogic hallucinations [Hypnopompic Hallucinations] : no hypnopompic hallucinations [Sleep Paralysis] : no sleep paralysis [de-identified] : h/o CVA [de-identified] : On Wellbutrin daily  [FreeTextEntry1] : 10-30 - 11 pm [FreeTextEntry2] : 6:30 - 7 am [FreeTextEntry3] : brief [FreeTextEntry4] : 0-1 x [FreeTextEntry5] : "right back" [FreeTextEntry6] : 7-hours  [FreeTextEntry7] : "rare"

## 2024-06-14 ENCOUNTER — APPOINTMENT (OUTPATIENT)
Dept: PODIATRY | Facility: CLINIC | Age: 79
End: 2024-06-14
Payer: MEDICARE

## 2024-06-14 DIAGNOSIS — B35.1 TINEA UNGUIUM: ICD-10-CM

## 2024-06-14 DIAGNOSIS — E11.49 TYPE 2 DIABETES MELLITUS WITH OTHER DIABETIC NEUROLOGICAL COMPLICATION: ICD-10-CM

## 2024-06-14 DIAGNOSIS — I99.8 OTHER DISORDER OF CIRCULATORY SYSTEM: ICD-10-CM

## 2024-06-14 PROCEDURE — 11721 DEBRIDE NAIL 6 OR MORE: CPT

## 2024-06-17 PROBLEM — E11.49 DM (DIABETES MELLITUS), TYPE 2 WITH NEUROLOGICAL COMPLICATIONS: Status: ACTIVE | Noted: 2022-09-07

## 2024-06-17 PROBLEM — I99.8 VASCULAR INSUFFICIENCY: Status: ACTIVE | Noted: 2024-06-17

## 2024-06-17 PROBLEM — B35.1 ONYCHOMYCOSIS: Status: ACTIVE | Noted: 2022-09-07

## 2024-06-18 NOTE — ASSESSMENT
[FreeTextEntry1] : Impression: Diabetic with neurological manifestations. Onychomycosis. Vascular insufficiency.  Treatment: Discussed findings and conditions with the patient. He is to continue diabetic pedal care. He is to examine his feet daily, avoid walking barefoot and moisturize his feet daily as well, avoiding moisturizer interdigitally. He is to continue with antifungal nail therapy as per his medical doctor. There is some improvement. Discussed also mixing a one-to-one mixture of white vinegar and water and soaking for approximately 10 minutes per day to help alleviate accumulation of subungual debris and to gently cleanse with a nail brush. He is to Lysol inside all shoes. Continue with white socks and bleach with laundry. He does wear vascular socks as needed. He is to elevate his feet throughout the day to help with his swelling in his legs. All nails were prepped and manually and mechanically debrided to patient's tolerance without incidence. Subungual debris was gently curettaged. Height of the nail was decreased and smoothed with a rotary burak. Patient is to return in approximately 3 months. Any pain, problems, concerns, or issues, he is to contact the office.

## 2024-06-18 NOTE — PHYSICAL EXAM
[Varicose Veins Of Lower Extremities] : bilaterally [2+] : left foot dorsalis pedis 2+ [FreeTextEntry3] : Negative calf tenderness. Negative signs of cellulitis bilaterally. Negative hair growth. Skin turgor is decreased. CFT: 3 seconds x10. Temperature gradient warmer distally bilaterally. There is brawny skin texture on bilateral lower extremities distal tibia bilateral. [de-identified] : Adductovarus rotation of bilateral 5th digits. Muscle strength is 5/5.  [FreeTextEntry4] : absent vibratory at the 1st MPJ and decreased at the medial malleoli [FreeTextEntry8] : absent vibratory at the 1st MPJ and decreased at the medial malleoli [FreeTextEntry1] : [place]-Fernandez monofilament testing absent at the hallux and lesser digits and diminished at the midfoot.

## 2024-06-18 NOTE — HISTORY OF PRESENT ILLNESS
[FreeTextEntry1] : Patient presents today for treatment of onychomycotic nails. He had been taking Lamisil as per Dr. Skelton who he had been trying to do pulse therapy. He is no longer taking Lamisil. He believes he is done as of now. He denies any issue with the medication or complications. His last hemoglobin A1c was approximately 7. It remains unchanged. He denies intermittent claudication or numbness or tingling in his feet at this time.

## 2024-09-20 ENCOUNTER — APPOINTMENT (OUTPATIENT)
Dept: PODIATRY | Facility: CLINIC | Age: 79
End: 2024-09-20
Payer: MEDICARE

## 2024-09-20 DIAGNOSIS — E11.49 TYPE 2 DIABETES MELLITUS WITH OTHER DIABETIC NEUROLOGICAL COMPLICATION: ICD-10-CM

## 2024-09-20 DIAGNOSIS — I99.8 OTHER DISORDER OF CIRCULATORY SYSTEM: ICD-10-CM

## 2024-09-20 DIAGNOSIS — B35.1 TINEA UNGUIUM: ICD-10-CM

## 2024-09-20 PROCEDURE — 11721 DEBRIDE NAIL 6 OR MORE: CPT

## 2024-09-26 NOTE — PHYSICAL EXAM
[Varicose Veins Of Lower Extremities] : bilaterally [2+] : left foot dorsalis pedis 2+ [FreeTextEntry3] : Negative calf tenderness. Negative signs of cellulitis bilaterally. Negative hair growth. Skin turgor is decreased. CFT: 3 seconds x10. Temperature gradient warmer distally bilaterally. There is brawny skin texture on bilateral lower extremities distal tibia bilateral. 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 Muscle strength 5/5.  [FreeTextEntry4] : absent vibratory at the 1st MPJ and decreased at the medial malleoli [FreeTextEntry8] : absent vibratory at the 1st MPJ and decreased at the medial malleoli [FreeTextEntry1] : [place]-Fernandez monofilament testing absent at the hallux and lesser digits and diminished at the midfoot.

## 2024-09-26 NOTE — PHYSICAL EXAM
[Varicose Veins Of Lower Extremities] : bilaterally [2+] : left foot dorsalis pedis 2+ [FreeTextEntry3] : Negative calf tenderness. Negative signs of cellulitis bilaterally. Negative hair growth. Skin turgor is decreased. CFT: 3 seconds x10. Temperature gradient warmer distally bilaterally. There is brawny skin texture on bilateral lower extremities distal tibia bilateral. 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 Muscle strength 5/5.  [FreeTextEntry4] : absent vibratory at the 1st MPJ and decreased at the medial malleoli [FreeTextEntry8] : absent vibratory at the 1st MPJ and decreased at the medial malleoli [FreeTextEntry1] : [place]-Fernandez monofilament testing absent at the hallux and lesser digits and diminished at the midfoot.

## 2024-09-26 NOTE — HISTORY OF PRESENT ILLNESS
[FreeTextEntry1] : Patient presents today for thickened, elongated nails. He states that he had been taking the Lamisil as per Dr. Skelton and has been doing the pulse therapy without any issues. He is no longer taking the Lamisil at this time and was continuing to monitor outgrowth of the nail. His last hemoglobin A1c was 7. He does not know his finger sticks today or yesterday. He last saw his medical doctor approximately a week ago. He denies any intermittent claudication pain or any other complaints at this time. He does have some swelling in bilateral legs upon examination and excoriation from scratching on the left leg.

## 2024-09-26 NOTE — ASSESSMENT
[FreeTextEntry1] : Impression: Diabetic with neuropathy. Vascular insufficiency. Onychomycosis.   Treatment: Discussed findings and conditions with the patient. He is to continue diabetic pedal care. He is to examine his feet daily, avoid walking barefoot and moisturize his feet daily as well, avoiding moisturizer interdigitally. He is to continue with antifungal nail therapy as per his medical doctor. There is some improvement. Discussed also mixing a one-to-one mixture of white vinegar and water and soaking for approximately 10 minutes per day to help alleviate accumulation of subungual debris and to gently cleanse with a nail brush. He is to Lysol inside all shoes. Continue with white socks and bleach with laundry. He does wear vascular socks as needed. He is to elevate his feet throughout the day to help with his swelling in his legs. All nails were prepped and manually and mechanically debrided to patient's tolerance without incidence. Subungual debris was gently curettaged. Height of the nail was decreased and smoothed with a rotary burak. Patient is to return in approximately 3 months. Any pain, problems, concerns, or issues, he is to contact the office.

## 2024-09-26 NOTE — PHYSICAL EXAM
[Varicose Veins Of Lower Extremities] : bilaterally [2+] : left foot dorsalis pedis 2+ [FreeTextEntry3] : Negative calf tenderness. Negative signs of cellulitis bilaterally. Negative hair growth. Skin turgor is decreased. CFT: 3 seconds x10. Temperature gradient warmer distally bilaterally. There is brawny skin texture on bilateral lower extremities distal tibia bilateral. 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 Muscle strength 5/5.  [FreeTextEntry4] : absent vibratory at the 1st MPJ and decreased at the medial malleoli [FreeTextEntry8] : absent vibratory at the 1st MPJ and decreased at the medial malleoli [FreeTextEntry1] : [place]-Fernandez monofilament testing absent at the hallux and lesser digits and diminished at the midfoot.

## 2025-01-31 ENCOUNTER — APPOINTMENT (OUTPATIENT)
Dept: PODIATRY | Facility: CLINIC | Age: 80
End: 2025-01-31
Payer: MEDICARE

## 2025-01-31 DIAGNOSIS — I99.8 OTHER DISORDER OF CIRCULATORY SYSTEM: ICD-10-CM

## 2025-01-31 DIAGNOSIS — E11.42 TYPE 2 DIABETES MELLITUS WITH DIABETIC POLYNEUROPATHY: ICD-10-CM

## 2025-01-31 PROCEDURE — 11721 DEBRIDE NAIL 6 OR MORE: CPT

## 2025-05-07 ENCOUNTER — NON-APPOINTMENT (OUTPATIENT)
Age: 80
End: 2025-05-07

## 2025-05-08 ENCOUNTER — APPOINTMENT (OUTPATIENT)
Dept: PODIATRY | Facility: CLINIC | Age: 80
End: 2025-05-08

## 2025-05-08 DIAGNOSIS — M79.674 PAIN IN RIGHT TOE(S): ICD-10-CM

## 2025-05-08 DIAGNOSIS — M79.675 PAIN IN LEFT TOE(S): ICD-10-CM

## 2025-05-08 DIAGNOSIS — B35.1 TINEA UNGUIUM: ICD-10-CM

## 2025-05-08 PROCEDURE — 11721 DEBRIDE NAIL 6 OR MORE: CPT

## 2025-07-23 ENCOUNTER — APPOINTMENT (OUTPATIENT)
Dept: PULMONOLOGY | Facility: CLINIC | Age: 80
End: 2025-07-23
Payer: MEDICARE

## 2025-07-23 DIAGNOSIS — G47.33 OBSTRUCTIVE SLEEP APNEA (ADULT) (PEDIATRIC): ICD-10-CM

## 2025-07-23 PROCEDURE — 99213 OFFICE O/P EST LOW 20 MIN: CPT | Mod: 2W

## 2025-09-11 ENCOUNTER — APPOINTMENT (OUTPATIENT)
Dept: PODIATRY | Facility: CLINIC | Age: 80
End: 2025-09-11
Payer: MEDICARE

## 2025-09-11 DIAGNOSIS — E11.49 TYPE 2 DIABETES MELLITUS WITH OTHER DIABETIC NEUROLOGICAL COMPLICATION: ICD-10-CM

## 2025-09-11 DIAGNOSIS — I99.8 OTHER DISORDER OF CIRCULATORY SYSTEM: ICD-10-CM

## 2025-09-11 DIAGNOSIS — B35.1 TINEA UNGUIUM: ICD-10-CM

## 2025-09-11 PROCEDURE — 11721 DEBRIDE NAIL 6 OR MORE: CPT
